# Patient Record
Sex: MALE | Race: WHITE | Employment: OTHER | URBAN - METROPOLITAN AREA
[De-identification: names, ages, dates, MRNs, and addresses within clinical notes are randomized per-mention and may not be internally consistent; named-entity substitution may affect disease eponyms.]

---

## 2018-03-26 ENCOUNTER — HOSPITAL ENCOUNTER (EMERGENCY)
Age: 38
Discharge: HOME | End: 2018-03-26
Payer: MEDICARE

## 2018-03-26 ENCOUNTER — APPOINTMENT (OUTPATIENT)
Dept: GENERAL RADIOLOGY | Age: 38
End: 2018-03-26
Payer: MEDICARE

## 2018-03-26 VITALS
HEART RATE: 80 BPM | BODY MASS INDEX: 27.2 KG/M2 | DIASTOLIC BLOOD PRESSURE: 84 MMHG | HEIGHT: 70 IN | RESPIRATION RATE: 18 BRPM | OXYGEN SATURATION: 100 % | TEMPERATURE: 98.2 F | WEIGHT: 190 LBS | SYSTOLIC BLOOD PRESSURE: 122 MMHG

## 2018-03-26 DIAGNOSIS — R42 DIZZINESS: Primary | ICD-10-CM

## 2018-03-26 DIAGNOSIS — K02.9 DENTAL CARIES: ICD-10-CM

## 2018-03-26 DIAGNOSIS — E86.0 DEHYDRATION: ICD-10-CM

## 2018-03-26 LAB
ALBUMIN SERPL-MCNC: 4.3 G/DL (ref 3.5–5.2)
ALP BLD-CCNC: 91 U/L (ref 40–129)
ALT SERPL-CCNC: 15 U/L (ref 0–40)
ANION GAP SERPL CALCULATED.3IONS-SCNC: 12 MMOL/L (ref 7–16)
AST SERPL-CCNC: 12 U/L (ref 0–39)
BASOPHILS ABSOLUTE: 0.03 E9/L (ref 0–0.2)
BASOPHILS RELATIVE PERCENT: 0.5 % (ref 0–2)
BILIRUB SERPL-MCNC: 0.8 MG/DL (ref 0–1.2)
BUN BLDV-MCNC: 16 MG/DL (ref 6–20)
CALCIUM SERPL-MCNC: 9.5 MG/DL (ref 8.6–10.2)
CHLORIDE BLD-SCNC: 99 MMOL/L (ref 98–107)
CHP ED QC CHECK: YES
CO2: 28 MMOL/L (ref 22–29)
CREAT SERPL-MCNC: 0.9 MG/DL (ref 0.7–1.2)
EOSINOPHILS ABSOLUTE: 0.17 E9/L (ref 0.05–0.5)
EOSINOPHILS RELATIVE PERCENT: 2.6 % (ref 0–6)
GFR AFRICAN AMERICAN: >60
GFR NON-AFRICAN AMERICAN: >60 ML/MIN/1.73
GLUCOSE BLD-MCNC: 229 MG/DL
GLUCOSE BLD-MCNC: 286 MG/DL (ref 74–109)
HCT VFR BLD CALC: 45.5 % (ref 37–54)
HEMOGLOBIN: 15.6 G/DL (ref 12.5–16.5)
IMMATURE GRANULOCYTES #: 0.02 E9/L
IMMATURE GRANULOCYTES %: 0.3 % (ref 0–5)
LYMPHOCYTES ABSOLUTE: 1.55 E9/L (ref 1.5–4)
LYMPHOCYTES RELATIVE PERCENT: 23.9 % (ref 20–42)
MAGNESIUM: 1.9 MG/DL (ref 1.6–2.6)
MCH RBC QN AUTO: 28 PG (ref 26–35)
MCHC RBC AUTO-ENTMCNC: 34.3 % (ref 32–34.5)
MCV RBC AUTO: 81.7 FL (ref 80–99.9)
METER GLUCOSE: 229 MG/DL (ref 70–110)
MONOCYTES ABSOLUTE: 0.41 E9/L (ref 0.1–0.95)
MONOCYTES RELATIVE PERCENT: 6.3 % (ref 2–12)
NEUTROPHILS ABSOLUTE: 4.3 E9/L (ref 1.8–7.3)
NEUTROPHILS RELATIVE PERCENT: 66.4 % (ref 43–80)
PDW BLD-RTO: 12.6 FL (ref 11.5–15)
PLATELET # BLD: 296 E9/L (ref 130–450)
PMV BLD AUTO: 10.2 FL (ref 7–12)
POTASSIUM SERPL-SCNC: 4.5 MMOL/L (ref 3.5–5)
RBC # BLD: 5.57 E12/L (ref 3.8–5.8)
SODIUM BLD-SCNC: 139 MMOL/L (ref 132–146)
TOTAL PROTEIN: 7.2 G/DL (ref 6.4–8.3)
WBC # BLD: 6.5 E9/L (ref 4.5–11.5)

## 2018-03-26 PROCEDURE — 85025 COMPLETE CBC W/AUTO DIFF WBC: CPT

## 2018-03-26 PROCEDURE — 99284 EMERGENCY DEPT VISIT MOD MDM: CPT

## 2018-03-26 PROCEDURE — 80053 COMPREHEN METABOLIC PANEL: CPT

## 2018-03-26 PROCEDURE — 83735 ASSAY OF MAGNESIUM: CPT

## 2018-03-26 PROCEDURE — 82962 GLUCOSE BLOOD TEST: CPT

## 2018-03-26 PROCEDURE — 36415 COLL VENOUS BLD VENIPUNCTURE: CPT

## 2018-03-26 PROCEDURE — 71046 X-RAY EXAM CHEST 2 VIEWS: CPT

## 2018-03-26 RX ORDER — IBUPROFEN 800 MG/1
800 TABLET ORAL EVERY 6 HOURS PRN
Qty: 20 TABLET | Refills: 0 | Status: SHIPPED | OUTPATIENT
Start: 2018-03-26 | End: 2019-08-09

## 2018-03-26 RX ORDER — AMOXICILLIN 500 MG/1
500 CAPSULE ORAL 3 TIMES DAILY
Qty: 30 CAPSULE | Refills: 0 | Status: SHIPPED | OUTPATIENT
Start: 2018-03-26 | End: 2018-04-05

## 2018-03-26 RX ORDER — MECLIZINE HYDROCHLORIDE 25 MG/1
25 TABLET ORAL 3 TIMES DAILY PRN
Qty: 21 TABLET | Refills: 0 | Status: SHIPPED | OUTPATIENT
Start: 2018-03-26 | End: 2018-03-28 | Stop reason: HOSPADM

## 2018-03-28 ENCOUNTER — OFFICE VISIT (OUTPATIENT)
Dept: INTERNAL MEDICINE | Age: 38
End: 2018-03-28
Payer: MEDICARE

## 2018-03-28 VITALS
BODY MASS INDEX: 29.35 KG/M2 | DIASTOLIC BLOOD PRESSURE: 78 MMHG | RESPIRATION RATE: 16 BRPM | SYSTOLIC BLOOD PRESSURE: 102 MMHG | HEIGHT: 70 IN | TEMPERATURE: 98 F | WEIGHT: 205 LBS | HEART RATE: 70 BPM

## 2018-03-28 DIAGNOSIS — Z23 NEED FOR INFLUENZA VACCINATION: ICD-10-CM

## 2018-03-28 DIAGNOSIS — R05.9 COUGH: ICD-10-CM

## 2018-03-28 DIAGNOSIS — Z79.4 TYPE 2 DIABETES MELLITUS WITH HYPERGLYCEMIA, WITH LONG-TERM CURRENT USE OF INSULIN (HCC): Primary | ICD-10-CM

## 2018-03-28 DIAGNOSIS — E11.65 TYPE 2 DIABETES MELLITUS WITH HYPERGLYCEMIA, WITH LONG-TERM CURRENT USE OF INSULIN (HCC): Primary | ICD-10-CM

## 2018-03-28 LAB — HBA1C MFR BLD: 10.4 %

## 2018-03-28 PROCEDURE — G8482 FLU IMMUNIZE ORDER/ADMIN: HCPCS | Performed by: INTERNAL MEDICINE

## 2018-03-28 PROCEDURE — G8427 DOCREV CUR MEDS BY ELIG CLIN: HCPCS | Performed by: INTERNAL MEDICINE

## 2018-03-28 PROCEDURE — 90732 PPSV23 VACC 2 YRS+ SUBQ/IM: CPT

## 2018-03-28 PROCEDURE — 83036 HEMOGLOBIN GLYCOSYLATED A1C: CPT | Performed by: INTERNAL MEDICINE

## 2018-03-28 PROCEDURE — G8419 CALC BMI OUT NRM PARAM NOF/U: HCPCS | Performed by: INTERNAL MEDICINE

## 2018-03-28 PROCEDURE — G0008 ADMIN INFLUENZA VIRUS VAC: HCPCS

## 2018-03-28 PROCEDURE — 6360000002 HC RX W HCPCS

## 2018-03-28 PROCEDURE — 3046F HEMOGLOBIN A1C LEVEL >9.0%: CPT | Performed by: INTERNAL MEDICINE

## 2018-03-28 PROCEDURE — 1036F TOBACCO NON-USER: CPT | Performed by: INTERNAL MEDICINE

## 2018-03-28 PROCEDURE — 99212 OFFICE O/P EST SF 10 MIN: CPT | Performed by: INTERNAL MEDICINE

## 2018-03-28 PROCEDURE — 99215 OFFICE O/P EST HI 40 MIN: CPT | Performed by: INTERNAL MEDICINE

## 2018-03-28 RX ORDER — DOXEPIN HYDROCHLORIDE 10 MG/1
CAPSULE ORAL
Refills: 1 | COMMUNITY
Start: 2018-03-05

## 2018-03-28 RX ORDER — BUDESONIDE AND FORMOTEROL FUMARATE DIHYDRATE 80; 4.5 UG/1; UG/1
2 AEROSOL RESPIRATORY (INHALATION) 2 TIMES DAILY
Qty: 1 INHALER | Refills: 5 | Status: SHIPPED | OUTPATIENT
Start: 2018-03-28 | End: 2018-08-16 | Stop reason: ALTCHOICE

## 2018-03-28 RX ORDER — INSULIN GLARGINE 100 [IU]/ML
55 INJECTION, SOLUTION SUBCUTANEOUS NIGHTLY
Qty: 5 VIAL | Refills: 5 | Status: SHIPPED | OUTPATIENT
Start: 2018-03-28 | End: 2018-04-27 | Stop reason: SDUPTHER

## 2018-03-28 RX ORDER — DULOXETIN HYDROCHLORIDE 60 MG/1
CAPSULE, DELAYED RELEASE ORAL
Refills: 3 | COMMUNITY
Start: 2018-02-04 | End: 2018-04-02

## 2018-03-28 RX ORDER — FLUTICASONE PROPIONATE 50 MCG
1 SPRAY, SUSPENSION (ML) NASAL DAILY
Qty: 1 BOTTLE | Refills: 5 | Status: SHIPPED | OUTPATIENT
Start: 2018-03-28 | End: 2018-12-19 | Stop reason: SDUPTHER

## 2018-03-28 RX ORDER — INSULIN GLARGINE 100 [IU]/ML
55 INJECTION, SOLUTION SUBCUTANEOUS NIGHTLY
Qty: 2 VIAL | Refills: 5 | Status: SHIPPED | OUTPATIENT
Start: 2018-03-28 | End: 2018-03-28 | Stop reason: SDUPTHER

## 2018-03-28 ASSESSMENT — ENCOUNTER SYMPTOMS
BACK PAIN: 0
SHORTNESS OF BREATH: 1
APNEA: 0
COLOR CHANGE: 0
ABDOMINAL DISTENTION: 0
DIARRHEA: 0
PHOTOPHOBIA: 0
VOMITING: 0
CHOKING: 0
WHEEZING: 0
ABDOMINAL PAIN: 0

## 2018-03-28 NOTE — PROGRESS NOTES
[facility] [address]  [facility]    Attending Physician Statement:  Quinton Pitt M.D., F.A.C.P. I have discussed the case, including pertinent history and exam findings with the resident. I have seen and examined the patient and the key elements of the encounter have been performed by me. I agree with the assessment, plan and orders as documented by the resident. Patient is seen for ER fu visit today. --march 26  Last ER notes reviewed, relative labs and imaging.  - chest tightness, felt wheezy, light headedness-- when exposed to fumes at train station  --these symptoms improved, but known asthma and takes albuteroll daily  -- ?moderate persistent asthma-- claims house has lot of mold    +dental caries, referral/appointment April 5    Seen one time with us prior in NOV-- noncompliant fu  Cough - going on 3 weeks no started with viral prodrome but still has sinus congestion, rhinorrhea (mild), and right middle ear effusion. These symptoms improved  He didn't fu in 1 week--but came in now 5months later  ? allerigies-- fu?  -     fexofenadine (ALLEGRA ALLERGY) 180 MG tablet; Take 1 tablet by mouth daily  -     fluticasone (FLONASE) 50 MCG/ACT nasal spray; 1 spray by Nasal route daily  -     XR CHEST STANDARD (2 VW)--Norml,  but wasn't done till seen in ER in March     Type 2 diabetes mellitus with hyperglycemia, with long-term current use of insulin ([address] - restarting metformin and lantus and follow up glucose logs in 1 week. Basic labs  -     metFORMIN (GLUCOPHAGE) 1000 MG tablet;  Take 1 tablet by mouth 2 times daily (with meals)--taking   -     insulin glargine (LANTUS) 100 UNIT/ML injection vial; Inject 50 Units into the skin nightly--taking 50--   -     Basic Metabolic Panel; N  -     Hemoglobin A1C; -- 8.4--but today now 10.4  -     Microalbumin / Creatinine Urine Ratio; N <30  -     Hepatic Function Panel; more recently in march N  -   ?mod persistent asthma without

## 2018-04-02 ENCOUNTER — OFFICE VISIT (OUTPATIENT)
Dept: INTERNAL MEDICINE | Age: 38
End: 2018-04-02
Payer: MEDICARE

## 2018-04-02 VITALS
HEART RATE: 86 BPM | WEIGHT: 210.8 LBS | TEMPERATURE: 98.2 F | BODY MASS INDEX: 30.18 KG/M2 | DIASTOLIC BLOOD PRESSURE: 80 MMHG | HEIGHT: 70 IN | SYSTOLIC BLOOD PRESSURE: 120 MMHG | RESPIRATION RATE: 16 BRPM

## 2018-04-02 DIAGNOSIS — B35.3 TINEA PEDIS OF BOTH FEET: ICD-10-CM

## 2018-04-02 DIAGNOSIS — E11.51 TYPE 2 DIABETES MELLITUS WITH DIABETIC PERIPHERAL ANGIOPATHY WITHOUT GANGRENE, UNSPECIFIED LONG TERM INSULIN USE STATUS: Primary | ICD-10-CM

## 2018-04-02 DIAGNOSIS — B35.1 ONYCHOMYCOSIS: ICD-10-CM

## 2018-04-02 PROCEDURE — 1036F TOBACCO NON-USER: CPT | Performed by: STUDENT IN AN ORGANIZED HEALTH CARE EDUCATION/TRAINING PROGRAM

## 2018-04-02 PROCEDURE — G8417 CALC BMI ABV UP PARAM F/U: HCPCS | Performed by: STUDENT IN AN ORGANIZED HEALTH CARE EDUCATION/TRAINING PROGRAM

## 2018-04-02 PROCEDURE — 99214 OFFICE O/P EST MOD 30 MIN: CPT | Performed by: STUDENT IN AN ORGANIZED HEALTH CARE EDUCATION/TRAINING PROGRAM

## 2018-04-02 PROCEDURE — G8427 DOCREV CUR MEDS BY ELIG CLIN: HCPCS | Performed by: STUDENT IN AN ORGANIZED HEALTH CARE EDUCATION/TRAINING PROGRAM

## 2018-04-02 PROCEDURE — 99212 OFFICE O/P EST SF 10 MIN: CPT | Performed by: INTERNAL MEDICINE

## 2018-04-02 PROCEDURE — 3046F HEMOGLOBIN A1C LEVEL >9.0%: CPT | Performed by: STUDENT IN AN ORGANIZED HEALTH CARE EDUCATION/TRAINING PROGRAM

## 2018-04-02 RX ORDER — GABAPENTIN 300 MG/1
300 CAPSULE ORAL DAILY
Qty: 30 CAPSULE | Refills: 2 | Status: SHIPPED | OUTPATIENT
Start: 2018-04-02 | End: 2018-05-17 | Stop reason: ALTCHOICE

## 2018-04-02 RX ORDER — TERBINAFINE HYDROCHLORIDE 250 MG/1
250 TABLET ORAL DAILY
Qty: 84 TABLET | Refills: 0 | Status: SHIPPED | OUTPATIENT
Start: 2018-04-02 | End: 2018-06-25

## 2018-04-02 ASSESSMENT — ENCOUNTER SYMPTOMS
DOUBLE VISION: 0
PHOTOPHOBIA: 1
RESPIRATORY NEGATIVE: 1
ORTHOPNEA: 0
BLURRED VISION: 1

## 2018-04-25 PROBLEM — E86.0 DEHYDRATION: Status: RESOLVED | Noted: 2018-03-26 | Resolved: 2018-04-25

## 2018-04-26 ENCOUNTER — HOSPITAL ENCOUNTER (EMERGENCY)
Age: 38
Discharge: HOME | End: 2018-04-26
Payer: MEDICARE

## 2018-04-26 ENCOUNTER — APPOINTMENT (OUTPATIENT)
Dept: CT IMAGING | Age: 38
End: 2018-04-26
Payer: MEDICARE

## 2018-04-26 VITALS
BODY MASS INDEX: 30.06 KG/M2 | DIASTOLIC BLOOD PRESSURE: 80 MMHG | TEMPERATURE: 97.5 F | RESPIRATION RATE: 16 BRPM | WEIGHT: 210 LBS | OXYGEN SATURATION: 99 % | SYSTOLIC BLOOD PRESSURE: 136 MMHG | HEART RATE: 86 BPM | HEIGHT: 70 IN

## 2018-04-26 DIAGNOSIS — E11.65 TYPE 2 DIABETES MELLITUS WITH HYPERGLYCEMIA, WITH LONG-TERM CURRENT USE OF INSULIN (HCC): ICD-10-CM

## 2018-04-26 DIAGNOSIS — R09.89 GLOBUS SENSATION: ICD-10-CM

## 2018-04-26 DIAGNOSIS — Z79.4 TYPE 2 DIABETES MELLITUS WITH HYPERGLYCEMIA, WITH LONG-TERM CURRENT USE OF INSULIN (HCC): ICD-10-CM

## 2018-04-26 DIAGNOSIS — E04.2 MULTINODULAR THYROID: ICD-10-CM

## 2018-04-26 DIAGNOSIS — E04.9 ENLARGED THYROID GLAND: Primary | ICD-10-CM

## 2018-04-26 LAB
ANION GAP SERPL CALCULATED.3IONS-SCNC: 13 MMOL/L (ref 7–16)
BASOPHILS ABSOLUTE: 0.03 E9/L (ref 0–0.2)
BASOPHILS RELATIVE PERCENT: 0.4 % (ref 0–2)
BUN BLDV-MCNC: 15 MG/DL (ref 6–20)
CALCIUM SERPL-MCNC: 9.7 MG/DL (ref 8.6–10.2)
CHLORIDE BLD-SCNC: 97 MMOL/L (ref 98–107)
CO2: 27 MMOL/L (ref 22–29)
CREAT SERPL-MCNC: 1 MG/DL (ref 0.7–1.2)
EOSINOPHILS ABSOLUTE: 0.13 E9/L (ref 0.05–0.5)
EOSINOPHILS RELATIVE PERCENT: 1.9 % (ref 0–6)
GFR AFRICAN AMERICAN: >60
GFR NON-AFRICAN AMERICAN: >60 ML/MIN/1.73
GLUCOSE BLD-MCNC: 314 MG/DL (ref 74–109)
HCT VFR BLD CALC: 42.6 % (ref 37–54)
HEMOGLOBIN: 14.8 G/DL (ref 12.5–16.5)
IMMATURE GRANULOCYTES #: 0.02 E9/L
IMMATURE GRANULOCYTES %: 0.3 % (ref 0–5)
LYMPHOCYTES ABSOLUTE: 1.89 E9/L (ref 1.5–4)
LYMPHOCYTES RELATIVE PERCENT: 27.4 % (ref 20–42)
MCH RBC QN AUTO: 28.2 PG (ref 26–35)
MCHC RBC AUTO-ENTMCNC: 34.7 % (ref 32–34.5)
MCV RBC AUTO: 81.3 FL (ref 80–99.9)
MONOCYTES ABSOLUTE: 0.61 E9/L (ref 0.1–0.95)
MONOCYTES RELATIVE PERCENT: 8.9 % (ref 2–12)
NEUTROPHILS ABSOLUTE: 4.21 E9/L (ref 1.8–7.3)
NEUTROPHILS RELATIVE PERCENT: 61.1 % (ref 43–80)
PDW BLD-RTO: 12.7 FL (ref 11.5–15)
PLATELET # BLD: 265 E9/L (ref 130–450)
PMV BLD AUTO: 9.8 FL (ref 7–12)
POTASSIUM SERPL-SCNC: 4.3 MMOL/L (ref 3.5–5)
RBC # BLD: 5.24 E12/L (ref 3.8–5.8)
SODIUM BLD-SCNC: 137 MMOL/L (ref 132–146)
WBC # BLD: 6.9 E9/L (ref 4.5–11.5)

## 2018-04-26 PROCEDURE — 2580000003 HC RX 258: Performed by: NURSE PRACTITIONER

## 2018-04-26 PROCEDURE — 80048 BASIC METABOLIC PNL TOTAL CA: CPT

## 2018-04-26 PROCEDURE — 84436 ASSAY OF TOTAL THYROXINE: CPT

## 2018-04-26 PROCEDURE — 99284 EMERGENCY DEPT VISIT MOD MDM: CPT

## 2018-04-26 PROCEDURE — 84443 ASSAY THYROID STIM HORMONE: CPT

## 2018-04-26 PROCEDURE — 2580000003 HC RX 258

## 2018-04-26 PROCEDURE — 36415 COLL VENOUS BLD VENIPUNCTURE: CPT

## 2018-04-26 PROCEDURE — 6360000002 HC RX W HCPCS: Performed by: NURSE PRACTITIONER

## 2018-04-26 PROCEDURE — 96374 THER/PROPH/DIAG INJ IV PUSH: CPT

## 2018-04-26 PROCEDURE — 6360000004 HC RX CONTRAST MEDICATION: Performed by: RADIOLOGY

## 2018-04-26 PROCEDURE — C9113 INJ PANTOPRAZOLE SODIUM, VIA: HCPCS | Performed by: NURSE PRACTITIONER

## 2018-04-26 PROCEDURE — 70491 CT SOFT TISSUE NECK W/DYE: CPT

## 2018-04-26 PROCEDURE — 85025 COMPLETE CBC W/AUTO DIFF WBC: CPT

## 2018-04-26 RX ORDER — OMEPRAZOLE 20 MG/1
20 CAPSULE, DELAYED RELEASE ORAL EVERY 12 HOURS
Qty: 28 CAPSULE | Refills: 0 | Status: SHIPPED | OUTPATIENT
Start: 2018-04-26 | End: 2018-08-16 | Stop reason: ALTCHOICE

## 2018-04-26 RX ORDER — PANTOPRAZOLE SODIUM 40 MG/10ML
40 INJECTION, POWDER, LYOPHILIZED, FOR SOLUTION INTRAVENOUS ONCE
Status: COMPLETED | OUTPATIENT
Start: 2018-04-26 | End: 2018-04-26

## 2018-04-26 RX ORDER — 0.9 % SODIUM CHLORIDE 0.9 %
1000 INTRAVENOUS SOLUTION INTRAVENOUS ONCE
Status: COMPLETED | OUTPATIENT
Start: 2018-04-26 | End: 2018-04-26

## 2018-04-26 RX ORDER — 0.9 % SODIUM CHLORIDE 0.9 %
VIAL (ML) INJECTION
Status: COMPLETED
Start: 2018-04-26 | End: 2018-04-26

## 2018-04-26 RX ADMIN — PANTOPRAZOLE SODIUM 40 MG: 40 INJECTION, POWDER, FOR SOLUTION INTRAVENOUS at 23:09

## 2018-04-26 RX ADMIN — SODIUM CHLORIDE 10 ML: 9 INJECTION INTRAMUSCULAR; INTRAVENOUS; SUBCUTANEOUS at 23:09

## 2018-04-26 RX ADMIN — IOPAMIDOL 90 ML: 755 INJECTION, SOLUTION INTRAVENOUS at 22:52

## 2018-04-26 RX ADMIN — SODIUM CHLORIDE 1000 ML: 9 INJECTION, SOLUTION INTRAVENOUS at 22:22

## 2018-04-26 ASSESSMENT — PAIN DESCRIPTION - PAIN TYPE: TYPE: ACUTE PAIN

## 2018-04-26 ASSESSMENT — PAIN DESCRIPTION - LOCATION: LOCATION: JAW

## 2018-04-26 ASSESSMENT — PAIN DESCRIPTION - DESCRIPTORS: DESCRIPTORS: ACHING

## 2018-04-26 ASSESSMENT — PAIN SCALES - GENERAL: PAINLEVEL_OUTOF10: 1

## 2018-04-27 ENCOUNTER — OFFICE VISIT (OUTPATIENT)
Dept: INTERNAL MEDICINE | Age: 38
End: 2018-04-27
Payer: MEDICARE

## 2018-04-27 VITALS
SYSTOLIC BLOOD PRESSURE: 120 MMHG | TEMPERATURE: 98.1 F | HEIGHT: 71 IN | BODY MASS INDEX: 29.03 KG/M2 | HEART RATE: 92 BPM | DIASTOLIC BLOOD PRESSURE: 82 MMHG | WEIGHT: 207.4 LBS

## 2018-04-27 DIAGNOSIS — Z79.4 TYPE 2 DIABETES MELLITUS WITH HYPERGLYCEMIA, WITH LONG-TERM CURRENT USE OF INSULIN (HCC): ICD-10-CM

## 2018-04-27 DIAGNOSIS — E11.65 TYPE 2 DIABETES MELLITUS WITH HYPERGLYCEMIA, WITH LONG-TERM CURRENT USE OF INSULIN (HCC): ICD-10-CM

## 2018-04-27 DIAGNOSIS — E04.2 MULTINODULAR GOITER: Primary | ICD-10-CM

## 2018-04-27 DIAGNOSIS — Z11.4 ENCOUNTER FOR SCREENING FOR HIV: ICD-10-CM

## 2018-04-27 LAB
T4 TOTAL: 5.9 MCG/DL (ref 4.5–11.7)
TSH SERPL DL<=0.05 MIU/L-ACNC: 0.81 UIU/ML (ref 0.27–4.2)

## 2018-04-27 PROCEDURE — 99213 OFFICE O/P EST LOW 20 MIN: CPT | Performed by: INTERNAL MEDICINE

## 2018-04-27 PROCEDURE — 4004F PT TOBACCO SCREEN RCVD TLK: CPT | Performed by: INTERNAL MEDICINE

## 2018-04-27 PROCEDURE — G8417 CALC BMI ABV UP PARAM F/U: HCPCS | Performed by: INTERNAL MEDICINE

## 2018-04-27 PROCEDURE — 2022F DILAT RTA XM EVC RTNOPTHY: CPT | Performed by: INTERNAL MEDICINE

## 2018-04-27 PROCEDURE — 3046F HEMOGLOBIN A1C LEVEL >9.0%: CPT | Performed by: INTERNAL MEDICINE

## 2018-04-27 PROCEDURE — G8427 DOCREV CUR MEDS BY ELIG CLIN: HCPCS | Performed by: INTERNAL MEDICINE

## 2018-04-27 PROCEDURE — 99214 OFFICE O/P EST MOD 30 MIN: CPT | Performed by: INTERNAL MEDICINE

## 2018-04-27 RX ORDER — INSULIN GLARGINE 100 [IU]/ML
60 INJECTION, SOLUTION SUBCUTANEOUS NIGHTLY
Qty: 5 VIAL | Refills: 5 | Status: SHIPPED | OUTPATIENT
Start: 2018-04-27 | End: 2018-08-28 | Stop reason: SDUPTHER

## 2018-04-27 ASSESSMENT — ENCOUNTER SYMPTOMS
TROUBLE SWALLOWING: 0
SORE THROAT: 0
COLOR CHANGE: 0
VOICE CHANGE: 0
APNEA: 0
VOMITING: 0
ABDOMINAL DISTENTION: 0
BACK PAIN: 0
WHEEZING: 0
PHOTOPHOBIA: 0
ABDOMINAL PAIN: 0

## 2018-05-12 ENCOUNTER — HOSPITAL ENCOUNTER (EMERGENCY)
Age: 38
Discharge: HOME | End: 2018-05-12
Attending: EMERGENCY MEDICINE
Payer: MEDICARE

## 2018-05-12 VITALS
BODY MASS INDEX: 28.7 KG/M2 | RESPIRATION RATE: 14 BRPM | TEMPERATURE: 98.2 F | HEIGHT: 71 IN | HEART RATE: 82 BPM | SYSTOLIC BLOOD PRESSURE: 126 MMHG | OXYGEN SATURATION: 98 % | DIASTOLIC BLOOD PRESSURE: 80 MMHG | WEIGHT: 205 LBS

## 2018-05-12 DIAGNOSIS — L03.114 CELLULITIS OF LEFT HAND: Primary | ICD-10-CM

## 2018-05-12 PROCEDURE — 99281 EMR DPT VST MAYX REQ PHY/QHP: CPT

## 2018-05-12 RX ORDER — CEPHALEXIN 500 MG/1
500 CAPSULE ORAL 4 TIMES DAILY
Qty: 28 CAPSULE | Refills: 0 | Status: SHIPPED | OUTPATIENT
Start: 2018-05-12 | End: 2018-05-19

## 2018-05-15 ENCOUNTER — HOSPITAL ENCOUNTER (OUTPATIENT)
Dept: ULTRASOUND IMAGING | Age: 38
Discharge: HOME | End: 2018-05-17
Payer: MEDICARE

## 2018-05-15 ENCOUNTER — OFFICE VISIT (OUTPATIENT)
Dept: INTERNAL MEDICINE | Age: 38
End: 2018-05-15
Payer: MEDICARE

## 2018-05-15 VITALS
WEIGHT: 215 LBS | DIASTOLIC BLOOD PRESSURE: 75 MMHG | HEART RATE: 83 BPM | TEMPERATURE: 97.9 F | RESPIRATION RATE: 16 BRPM | SYSTOLIC BLOOD PRESSURE: 119 MMHG | HEIGHT: 71 IN | BODY MASS INDEX: 30.1 KG/M2

## 2018-05-15 DIAGNOSIS — E11.40 TYPE 2 DIABETES MELLITUS WITH DIABETIC NEUROPATHY, WITH LONG-TERM CURRENT USE OF INSULIN (HCC): Primary | ICD-10-CM

## 2018-05-15 DIAGNOSIS — Z79.4 TYPE 2 DIABETES MELLITUS WITH DIABETIC NEUROPATHY, WITH LONG-TERM CURRENT USE OF INSULIN (HCC): Primary | ICD-10-CM

## 2018-05-15 DIAGNOSIS — G72.9 MYOPATHY: ICD-10-CM

## 2018-05-15 DIAGNOSIS — L29.9 ITCHING WITH IRRITATION: ICD-10-CM

## 2018-05-15 DIAGNOSIS — L03.114 CELLULITIS OF LEFT UPPER EXTREMITY: ICD-10-CM

## 2018-05-15 DIAGNOSIS — E04.2 MULTINODULAR GOITER: ICD-10-CM

## 2018-05-15 PROCEDURE — 3046F HEMOGLOBIN A1C LEVEL >9.0%: CPT | Performed by: INTERNAL MEDICINE

## 2018-05-15 PROCEDURE — 2022F DILAT RTA XM EVC RTNOPTHY: CPT | Performed by: INTERNAL MEDICINE

## 2018-05-15 PROCEDURE — 99213 OFFICE O/P EST LOW 20 MIN: CPT | Performed by: INTERNAL MEDICINE

## 2018-05-15 PROCEDURE — 1036F TOBACCO NON-USER: CPT | Performed by: INTERNAL MEDICINE

## 2018-05-15 PROCEDURE — G8417 CALC BMI ABV UP PARAM F/U: HCPCS | Performed by: INTERNAL MEDICINE

## 2018-05-15 PROCEDURE — G8427 DOCREV CUR MEDS BY ELIG CLIN: HCPCS | Performed by: INTERNAL MEDICINE

## 2018-05-15 PROCEDURE — 76536 US EXAM OF HEAD AND NECK: CPT

## 2018-05-15 RX ORDER — CLOTRIMAZOLE 1 %
CREAM (GRAM) TOPICAL
Qty: 1 TUBE | Refills: 3 | Status: SHIPPED | OUTPATIENT
Start: 2018-05-15 | End: 2018-05-22

## 2018-05-15 ASSESSMENT — ENCOUNTER SYMPTOMS
GASTROINTESTINAL NEGATIVE: 1
EYES NEGATIVE: 1
RESPIRATORY NEGATIVE: 1

## 2018-05-17 ENCOUNTER — OFFICE VISIT (OUTPATIENT)
Dept: INTERNAL MEDICINE | Age: 38
End: 2018-05-17
Payer: MEDICARE

## 2018-05-17 ENCOUNTER — HOSPITAL ENCOUNTER (OUTPATIENT)
Age: 38
Discharge: HOME | End: 2018-05-17
Payer: MEDICARE

## 2018-05-17 VITALS
SYSTOLIC BLOOD PRESSURE: 130 MMHG | TEMPERATURE: 98.1 F | BODY MASS INDEX: 30.15 KG/M2 | HEIGHT: 71 IN | RESPIRATION RATE: 16 BRPM | OXYGEN SATURATION: 95 % | HEART RATE: 85 BPM | DIASTOLIC BLOOD PRESSURE: 70 MMHG | WEIGHT: 215.4 LBS

## 2018-05-17 DIAGNOSIS — R42 LIGHTHEADEDNESS: ICD-10-CM

## 2018-05-17 DIAGNOSIS — L03.114 CELLULITIS OF HAND, LEFT: Primary | ICD-10-CM

## 2018-05-17 DIAGNOSIS — Z79.4 TYPE 2 DIABETES MELLITUS WITH HYPERGLYCEMIA, WITH LONG-TERM CURRENT USE OF INSULIN (HCC): ICD-10-CM

## 2018-05-17 DIAGNOSIS — F33.3 SEVERE EPISODE OF RECURRENT MAJOR DEPRESSIVE DISORDER, WITH PSYCHOTIC FEATURES (HCC): ICD-10-CM

## 2018-05-17 DIAGNOSIS — G72.9 MYOPATHY: ICD-10-CM

## 2018-05-17 DIAGNOSIS — F22 PARANOIA (HCC): ICD-10-CM

## 2018-05-17 DIAGNOSIS — E11.65 TYPE 2 DIABETES MELLITUS WITH HYPERGLYCEMIA, WITH LONG-TERM CURRENT USE OF INSULIN (HCC): ICD-10-CM

## 2018-05-17 DIAGNOSIS — E11.9 DIABETES MELLITUS TYPE 2 IN NONOBESE (HCC): ICD-10-CM

## 2018-05-17 DIAGNOSIS — Z11.4 ENCOUNTER FOR SCREENING FOR HIV: ICD-10-CM

## 2018-05-17 LAB
CHOLESTEROL, TOTAL: 144 MG/DL (ref 0–199)
HDLC SERPL-MCNC: 52 MG/DL
LDL CHOLESTEROL CALCULATED: 71 MG/DL (ref 0–99)
TRIGL SERPL-MCNC: 104 MG/DL (ref 0–149)
VLDLC SERPL CALC-MCNC: 21 MG/DL

## 2018-05-17 PROCEDURE — 86235 NUCLEAR ANTIGEN ANTIBODY: CPT

## 2018-05-17 PROCEDURE — 86039 ANTINUCLEAR ANTIBODIES (ANA): CPT

## 2018-05-17 PROCEDURE — 36415 COLL VENOUS BLD VENIPUNCTURE: CPT

## 2018-05-17 PROCEDURE — 86703 HIV-1/HIV-2 1 RESULT ANTBDY: CPT

## 2018-05-17 PROCEDURE — 99213 OFFICE O/P EST LOW 20 MIN: CPT | Performed by: INTERNAL MEDICINE

## 2018-05-17 PROCEDURE — 1036F TOBACCO NON-USER: CPT | Performed by: INTERNAL MEDICINE

## 2018-05-17 PROCEDURE — 86038 ANTINUCLEAR ANTIBODIES: CPT

## 2018-05-17 PROCEDURE — 3046F HEMOGLOBIN A1C LEVEL >9.0%: CPT | Performed by: INTERNAL MEDICINE

## 2018-05-17 PROCEDURE — 2022F DILAT RTA XM EVC RTNOPTHY: CPT | Performed by: INTERNAL MEDICINE

## 2018-05-17 PROCEDURE — 80061 LIPID PANEL: CPT

## 2018-05-17 PROCEDURE — G8417 CALC BMI ABV UP PARAM F/U: HCPCS | Performed by: INTERNAL MEDICINE

## 2018-05-17 PROCEDURE — G8427 DOCREV CUR MEDS BY ELIG CLIN: HCPCS | Performed by: INTERNAL MEDICINE

## 2018-05-17 PROCEDURE — 99212 OFFICE O/P EST SF 10 MIN: CPT | Performed by: INTERNAL MEDICINE

## 2018-05-18 LAB
ANA PATTERN: ABNORMAL
ANA TITER: ABNORMAL
ANTI JO-1 IGG: NEGATIVE
ANTI-NUCLEAR ANTIBODY (ANA): POSITIVE
HIV-1 AND HIV-2 ANTIBODIES: NORMAL

## 2018-05-22 ENCOUNTER — HOSPITAL ENCOUNTER (OUTPATIENT)
Dept: DIABETES SERVICES | Age: 38
Setting detail: THERAPIES SERIES
Discharge: HOME | End: 2018-05-22
Payer: MEDICARE

## 2018-05-22 PROCEDURE — G0109 DIAB MANAGE TRN IND/GROUP: HCPCS

## 2018-05-23 ENCOUNTER — HOSPITAL ENCOUNTER (OUTPATIENT)
Dept: DIABETES SERVICES | Age: 38
Setting detail: THERAPIES SERIES
Discharge: HOME | End: 2018-05-23
Payer: MEDICARE

## 2018-05-23 PROCEDURE — G0109 DIAB MANAGE TRN IND/GROUP: HCPCS

## 2018-05-24 ASSESSMENT — PATIENT HEALTH QUESTIONNAIRE - PHQ9
2. FEELING DOWN, DEPRESSED OR HOPELESS: 0
3. TROUBLE FALLING OR STAYING ASLEEP: 1
10. IF YOU CHECKED OFF ANY PROBLEMS, HOW DIFFICULT HAVE THESE PROBLEMS MADE IT FOR YOU TO DO YOUR WORK, TAKE CARE OF THINGS AT HOME, OR GET ALONG WITH OTHER PEOPLE: 0
9. THOUGHTS THAT YOU WOULD BE BETTER OFF DEAD, OR OF HURTING YOURSELF: 0
7. TROUBLE CONCENTRATING ON THINGS, SUCH AS READING THE NEWSPAPER OR WATCHING TELEVISION: 0
1. LITTLE INTEREST OR PLEASURE IN DOING THINGS: 0
SUM OF ALL RESPONSES TO PHQ QUESTIONS 1-9: 5
5. POOR APPETITE OR OVEREATING: 2
6. FEELING BAD ABOUT YOURSELF - OR THAT YOU ARE A FAILURE OR HAVE LET YOURSELF OR YOUR FAMILY DOWN: 0
4. FEELING TIRED OR HAVING LITTLE ENERGY: 2
SUM OF ALL RESPONSES TO PHQ9 QUESTIONS 1 & 2: 0
8. MOVING OR SPEAKING SO SLOWLY THAT OTHER PEOPLE COULD HAVE NOTICED. OR THE OPPOSITE, BEING SO FIGETY OR RESTLESS THAT YOU HAVE BEEN MOVING AROUND A LOT MORE THAN USUAL: 0

## 2018-06-11 ENCOUNTER — HOSPITAL ENCOUNTER (EMERGENCY)
Age: 38
Discharge: HOME | End: 2018-06-11
Attending: FAMILY MEDICINE
Payer: MEDICARE

## 2018-06-11 VITALS
OXYGEN SATURATION: 99 % | HEART RATE: 88 BPM | BODY MASS INDEX: 30.1 KG/M2 | HEIGHT: 71 IN | TEMPERATURE: 98.6 F | WEIGHT: 215 LBS | RESPIRATION RATE: 16 BRPM | DIASTOLIC BLOOD PRESSURE: 80 MMHG | SYSTOLIC BLOOD PRESSURE: 130 MMHG

## 2018-06-11 DIAGNOSIS — L03.119 CELLULITIS AND ABSCESS OF LEG: Primary | ICD-10-CM

## 2018-06-11 DIAGNOSIS — L02.419 CELLULITIS AND ABSCESS OF LEG: Primary | ICD-10-CM

## 2018-06-11 PROCEDURE — 99282 EMERGENCY DEPT VISIT SF MDM: CPT

## 2018-06-11 RX ORDER — SULFAMETHOXAZOLE AND TRIMETHOPRIM 800; 160 MG/1; MG/1
1 TABLET ORAL 2 TIMES DAILY
Qty: 20 TABLET | Refills: 0 | Status: SHIPPED | OUTPATIENT
Start: 2018-06-11 | End: 2018-06-21

## 2018-06-11 RX ORDER — MUPIROCIN CALCIUM 20 MG/G
CREAM TOPICAL
Qty: 30 G | Refills: 0 | Status: SHIPPED | OUTPATIENT
Start: 2018-06-11 | End: 2018-09-10 | Stop reason: SDUPTHER

## 2018-06-11 ASSESSMENT — ENCOUNTER SYMPTOMS
THROAT SWELLING: 0
NAUSEA: 0
VOMITING: 0
SORE THROAT: 0
WHEEZING: 0
HOARSE VOICE: 0
SHORTNESS OF BREATH: 0
PERI-ORBITAL EDEMA: 0
ABDOMINAL PAIN: 0
DIARRHEA: 0

## 2018-06-11 ASSESSMENT — PAIN SCALES - GENERAL: PAINLEVEL_OUTOF10: 0

## 2018-06-13 ENCOUNTER — TELEPHONE (OUTPATIENT)
Dept: INTERNAL MEDICINE | Age: 38
End: 2018-06-13

## 2018-08-16 ENCOUNTER — HOSPITAL ENCOUNTER (EMERGENCY)
Age: 38
Discharge: HOME | End: 2018-08-16
Attending: FAMILY MEDICINE
Payer: MEDICARE

## 2018-08-16 VITALS
TEMPERATURE: 98.4 F | BODY MASS INDEX: 30.06 KG/M2 | HEIGHT: 70 IN | RESPIRATION RATE: 16 BRPM | HEART RATE: 89 BPM | SYSTOLIC BLOOD PRESSURE: 136 MMHG | WEIGHT: 210 LBS | OXYGEN SATURATION: 100 % | DIASTOLIC BLOOD PRESSURE: 84 MMHG

## 2018-08-16 DIAGNOSIS — L73.9 FOLLICULITIS: ICD-10-CM

## 2018-08-16 DIAGNOSIS — K21.9 CHRONIC GASTROESOPHAGEAL REFLUX DISEASE: Primary | ICD-10-CM

## 2018-08-16 PROCEDURE — 99283 EMERGENCY DEPT VISIT LOW MDM: CPT

## 2018-08-16 RX ORDER — CLINDAMYCIN PHOSPHATE 10 MG/G
GEL TOPICAL
Qty: 60 G | Refills: 0 | Status: SHIPPED | OUTPATIENT
Start: 2018-08-16 | End: 2018-08-23

## 2018-08-16 RX ORDER — PRENATAL VIT 91/IRON/FOLIC/DHA 28-975-200
COMBINATION PACKAGE (EA) ORAL
Qty: 24 G | Refills: 0 | Status: SHIPPED | OUTPATIENT
Start: 2018-08-16

## 2018-08-16 RX ORDER — OMEPRAZOLE 20 MG/1
20 CAPSULE, DELAYED RELEASE ORAL EVERY 12 HOURS
Qty: 28 CAPSULE | Refills: 0 | Status: SHIPPED | OUTPATIENT
Start: 2018-08-16 | End: 2018-12-19 | Stop reason: SDUPTHER

## 2018-08-16 NOTE — ED PROVIDER NOTES
HPI:  8/16/18,   Time: 4:40 PM         Leroy Rosario is a 43 y.o. male presenting to the ED for worsening gastric reflux, beginning weeks ago. The complaint has been persistent, moderate in severity, and worsened by nothing. Has been taking Prilosec daily without much improvement there is no difficulty with secretions no hoarseness no cough fever or chills no chest pain no abdominal pain patient does have a rash on the medial mid left thigh in a different type of itchy rash proximal medial aspect of the right is no weeping. ROS:   Pertinent positives and negatives are stated within HPI, all other systems reviewed and are negative.  --------------------------------------------- PAST HISTORY ---------------------------------------------  Past Medical History:  has a past medical history of Asthma; Diabetes mellitus ([facility] [address] and Hypertension. Past Surgical History:  has no past surgical history on file. Social History:  reports that he quit smoking about 2 years ago. He has a 4.00 pack-year smoking history. He has never used smokeless tobacco. He reports that he does not drink alcohol or use drugs. Family History: family history includes Heart Attack in his father; Lupus in his mother. The patients home medications have been reviewed. Allergies: Milk-related compounds    -------------------------------------------------- RESULTS -------------------------------------------------  All laboratory and radiology results have been personally reviewed by myself   LABS:  No results found for this visit on 08/16/18. RADIOLOGY:  Interpreted by Radiologist.  No orders to display       ------------------------- NURSING NOTES AND VITALS REVIEWED ---------------------------   The nursing notes within the ED encounter and vital signs as below have been reviewed.    /84   Pulse 89   Temp 98.4 °F (36.9 °C) (Oral)   Resp 16   Ht 5' 10\" (1.778 m)   Wt 210 lb (95.3 kg)   SpO2 100%   BMI 30.13 kg/m²

## 2018-08-28 ENCOUNTER — OFFICE VISIT (OUTPATIENT)
Dept: INTERNAL MEDICINE | Age: 38
End: 2018-08-28
Payer: MEDICARE

## 2018-08-28 VITALS
RESPIRATION RATE: 16 BRPM | HEIGHT: 70 IN | WEIGHT: 200 LBS | SYSTOLIC BLOOD PRESSURE: 120 MMHG | HEART RATE: 90 BPM | BODY MASS INDEX: 28.63 KG/M2 | TEMPERATURE: 98.9 F | DIASTOLIC BLOOD PRESSURE: 80 MMHG

## 2018-08-28 DIAGNOSIS — Z79.4 TYPE 2 DIABETES MELLITUS WITH HYPERGLYCEMIA, WITH LONG-TERM CURRENT USE OF INSULIN (HCC): ICD-10-CM

## 2018-08-28 DIAGNOSIS — L73.9 FOLLICULITIS: Primary | ICD-10-CM

## 2018-08-28 DIAGNOSIS — R53.83 FATIGUE, UNSPECIFIED TYPE: ICD-10-CM

## 2018-08-28 DIAGNOSIS — Z11.59 ENCOUNTER FOR SCREENING FOR OTHER VIRAL DISEASES: ICD-10-CM

## 2018-08-28 DIAGNOSIS — F19.11 HISTORY OF INTRAVENOUS DRUG ABUSE (HCC): ICD-10-CM

## 2018-08-28 DIAGNOSIS — E11.65 TYPE 2 DIABETES MELLITUS WITH HYPERGLYCEMIA, WITH LONG-TERM CURRENT USE OF INSULIN (HCC): ICD-10-CM

## 2018-08-28 LAB — HBA1C MFR BLD: 12.3 %

## 2018-08-28 PROCEDURE — G8428 CUR MEDS NOT DOCUMENT: HCPCS | Performed by: STUDENT IN AN ORGANIZED HEALTH CARE EDUCATION/TRAINING PROGRAM

## 2018-08-28 PROCEDURE — 1036F TOBACCO NON-USER: CPT | Performed by: STUDENT IN AN ORGANIZED HEALTH CARE EDUCATION/TRAINING PROGRAM

## 2018-08-28 PROCEDURE — 2022F DILAT RTA XM EVC RTNOPTHY: CPT | Performed by: STUDENT IN AN ORGANIZED HEALTH CARE EDUCATION/TRAINING PROGRAM

## 2018-08-28 PROCEDURE — G8417 CALC BMI ABV UP PARAM F/U: HCPCS | Performed by: STUDENT IN AN ORGANIZED HEALTH CARE EDUCATION/TRAINING PROGRAM

## 2018-08-28 PROCEDURE — 83036 HEMOGLOBIN GLYCOSYLATED A1C: CPT | Performed by: STUDENT IN AN ORGANIZED HEALTH CARE EDUCATION/TRAINING PROGRAM

## 2018-08-28 PROCEDURE — 99214 OFFICE O/P EST MOD 30 MIN: CPT | Performed by: STUDENT IN AN ORGANIZED HEALTH CARE EDUCATION/TRAINING PROGRAM

## 2018-08-28 PROCEDURE — 3046F HEMOGLOBIN A1C LEVEL >9.0%: CPT | Performed by: STUDENT IN AN ORGANIZED HEALTH CARE EDUCATION/TRAINING PROGRAM

## 2018-08-28 PROCEDURE — 99212 OFFICE O/P EST SF 10 MIN: CPT | Performed by: STUDENT IN AN ORGANIZED HEALTH CARE EDUCATION/TRAINING PROGRAM

## 2018-08-28 RX ORDER — INSULIN GLARGINE 100 [IU]/ML
20 INJECTION, SOLUTION SUBCUTANEOUS NIGHTLY
Qty: 5 VIAL | Refills: 5 | Status: SHIPPED | OUTPATIENT
Start: 2018-08-28 | End: 2018-10-24 | Stop reason: SDUPTHER

## 2018-08-28 RX ORDER — SITAGLIPTIN 50 MG/1
50 TABLET, FILM COATED ORAL DAILY
Qty: 30 TABLET | Refills: 3
Start: 2018-08-28 | End: 2018-09-10 | Stop reason: SDUPTHER

## 2018-08-28 RX ORDER — GLUCOSAMINE HCL/CHONDROITIN SU 500-400 MG
CAPSULE ORAL
Qty: 100 STRIP | Refills: 2 | Status: SHIPPED | OUTPATIENT
Start: 2018-08-28 | End: 2018-10-24 | Stop reason: SDUPTHER

## 2018-08-28 RX ORDER — SULFAMETHOXAZOLE AND TRIMETHOPRIM 400; 80 MG/1; MG/1
1 TABLET ORAL 2 TIMES DAILY
Qty: 20 TABLET | Refills: 0 | Status: SHIPPED | OUTPATIENT
Start: 2018-08-28 | End: 2018-09-07
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
## 2018-08-30 ENCOUNTER — OFFICE VISIT (OUTPATIENT)
Dept: INTERNAL MEDICINE | Age: 38
End: 2018-08-30
Payer: MEDICARE

## 2018-08-30 ENCOUNTER — HOSPITAL ENCOUNTER (OUTPATIENT)
Age: 38
Discharge: HOME | End: 2018-08-30
Payer: MEDICARE

## 2018-08-30 VITALS
RESPIRATION RATE: 16 BRPM | WEIGHT: 198.9 LBS | BODY MASS INDEX: 28.47 KG/M2 | HEIGHT: 70 IN | HEART RATE: 86 BPM | DIASTOLIC BLOOD PRESSURE: 76 MMHG | TEMPERATURE: 97.7 F | SYSTOLIC BLOOD PRESSURE: 112 MMHG

## 2018-08-30 DIAGNOSIS — F19.11 HISTORY OF INTRAVENOUS DRUG ABUSE (HCC): ICD-10-CM

## 2018-08-30 DIAGNOSIS — K02.9 DENTAL CARIES: ICD-10-CM

## 2018-08-30 DIAGNOSIS — Z11.59 ENCOUNTER FOR SCREENING FOR OTHER VIRAL DISEASES: ICD-10-CM

## 2018-08-30 PROCEDURE — 36415 COLL VENOUS BLD VENIPUNCTURE: CPT

## 2018-08-30 PROCEDURE — 99212 OFFICE O/P EST SF 10 MIN: CPT | Performed by: INTERNAL MEDICINE

## 2018-08-30 PROCEDURE — 1036F TOBACCO NON-USER: CPT | Performed by: INTERNAL MEDICINE

## 2018-08-30 PROCEDURE — G8427 DOCREV CUR MEDS BY ELIG CLIN: HCPCS | Performed by: INTERNAL MEDICINE

## 2018-08-30 PROCEDURE — 86706 HEP B SURFACE ANTIBODY: CPT

## 2018-08-30 PROCEDURE — G8417 CALC BMI ABV UP PARAM F/U: HCPCS | Performed by: INTERNAL MEDICINE

## 2018-08-30 PROCEDURE — 99213 OFFICE O/P EST LOW 20 MIN: CPT | Performed by: INTERNAL MEDICINE

## 2018-08-30 PROCEDURE — 86803 HEPATITIS C AB TEST: CPT

## 2018-08-30 PROCEDURE — 87340 HEPATITIS B SURFACE AG IA: CPT

## 2018-08-30 NOTE — PROGRESS NOTES
Inject 20 Units into the skin nightly 5 vial 5    omeprazole (PRILOSEC) 20 MG delayed release capsule Take 1 capsule by mouth every 12 hours for 14 days 28 capsule 0    terbinafine (LAMISIL) 1 % cream Apply topically 2 times daily. 24 g 0    doxepin (SINEQUAN) 10 MG capsule TK 1 OR 2 CS PO QHS PRN  1    fluticasone (FLONASE) 50 MCG/ACT nasal spray 1 spray by Nasal route daily 1 Bottle 5    albuterol sulfate HFA (VENTOLIN HFA) 108 (90 Base) MCG/ACT inhaler Inhale 2 puffs into the lungs every 6 hours as needed for Wheezing 1 Inhaler 3       I have reviewed all pertinent PMHx, PSHx, FamHx, SocialHx, medications, and allergies and updated history as appropriate. OBJECTIVE:    VS: /76 (Site: Left Arm, Position: Sitting, Cuff Size: Large Adult)   Pulse 86   Temp 97.7 °F (36.5 °C) (Oral)   Resp 16   Ht 5' 10\" (1.778 m)   Wt 198 lb 14.4 oz (90.2 kg)   BMI 28.54 kg/m²   General appearance: Alert, Awake, Oriented times 3, no distress  HEENT: no ulcer or whitish coating noticed  Lungs: Lungs clear to auscultation bilaterally. No rhonchi, crackles or wheezes  Heart: S1 S2  Regular rate and rhythm. No rub, murmur or gallop  Abdomen: Abdomen soft, non-tender. non-distended BS normal. No masses, organomegaly, no guarding rebound or rigidity. Extremities: No edema, Peripheral pulses palpable 2/4    ASSESSMENT/PLAN:    ? Coating at the back of tongue  Rinsing mouth with warm saline.    No alcohol     Folliculitis  Cont bactrim     IDDM, poorly controlled  humolog 6-6-6; lantus 20 nightly, Januvia 50 mg    I have reviewed my findings and recommendations with Ayaka Hernandez and Dr Elijah Gonzales MD PGY-1   8/30/2018 10:20 AM

## 2018-08-31 LAB
HBV SURFACE AB TITR SER: NORMAL {TITER}
HEPATITIS B SURFACE ANTIGEN INTERPRETATION: NORMAL
HEPATITIS C ANTIBODY INTERPRETATION: NORMAL

## 2018-09-10 ENCOUNTER — TELEPHONE (OUTPATIENT)
Dept: INTERNAL MEDICINE | Age: 38
End: 2018-09-10

## 2018-09-10 DIAGNOSIS — E11.65 TYPE 2 DIABETES MELLITUS WITH HYPERGLYCEMIA, WITH LONG-TERM CURRENT USE OF INSULIN (HCC): ICD-10-CM

## 2018-09-10 DIAGNOSIS — Z79.4 TYPE 2 DIABETES MELLITUS WITH HYPERGLYCEMIA, WITH LONG-TERM CURRENT USE OF INSULIN (HCC): ICD-10-CM

## 2018-09-10 RX ORDER — MUPIROCIN CALCIUM 20 MG/G
CREAM TOPICAL
Qty: 30 G | Refills: 0 | Status: SHIPPED | OUTPATIENT
Start: 2018-09-10 | End: 2018-11-05 | Stop reason: SDUPTHER

## 2018-09-10 NOTE — TELEPHONE ENCOUNTER
Pt called into . [facility], states that during his recent visit he was told that several scripts would be faxed to his pharmacy for pickup. Pt states that the only script that was sent was HUMALOG. Pt would like to know the status of the other scripts. Informed pt that message will be forwarded to clinical staff.      .Electronically signed by Juan Antonio Chen on 9/10/18 at 3:24 PM

## 2018-09-10 NOTE — TELEPHONE ENCOUNTER
Spoke with pt via phone regarding refill request. Pt notified medications were sent to [facility]. Pt states he does go between the pharmacy there and the [address] Pt instructed to call local pharmacy and have scripts transferred. Januvia was marked as no print and needs sent to pharmacy. Pt states he never got Bactroban. Asking if it can be reordered. Per last note pt has folliculitis.

## 2018-09-15 ENCOUNTER — HOSPITAL ENCOUNTER (EMERGENCY)
Age: 38
Discharge: HOME | End: 2018-09-15
Attending: EMERGENCY MEDICINE
Payer: MEDICARE

## 2018-09-15 VITALS
WEIGHT: 200 LBS | BODY MASS INDEX: 28 KG/M2 | RESPIRATION RATE: 16 BRPM | SYSTOLIC BLOOD PRESSURE: 138 MMHG | HEART RATE: 92 BPM | OXYGEN SATURATION: 98 % | HEIGHT: 71 IN | TEMPERATURE: 98.3 F | DIASTOLIC BLOOD PRESSURE: 88 MMHG

## 2018-09-15 DIAGNOSIS — M62.838 SPASM OF MUSCLE: Primary | ICD-10-CM

## 2018-09-15 DIAGNOSIS — R25.2 MUSCLE CRAMP: ICD-10-CM

## 2018-09-15 LAB
ANION GAP SERPL CALCULATED.3IONS-SCNC: 11 MMOL/L (ref 7–16)
BASOPHILS ABSOLUTE: 0.04 E9/L (ref 0–0.2)
BASOPHILS RELATIVE PERCENT: 0.7 % (ref 0–2)
BUN BLDV-MCNC: 17 MG/DL (ref 6–20)
CALCIUM SERPL-MCNC: 9.3 MG/DL (ref 8.6–10.2)
CHLORIDE BLD-SCNC: 95 MMOL/L (ref 98–107)
CO2: 27 MMOL/L (ref 22–29)
CREAT SERPL-MCNC: 1 MG/DL (ref 0.7–1.2)
EOSINOPHILS ABSOLUTE: 0.18 E9/L (ref 0.05–0.5)
EOSINOPHILS RELATIVE PERCENT: 3.2 % (ref 0–6)
GFR AFRICAN AMERICAN: >60
GFR NON-AFRICAN AMERICAN: >60 ML/MIN/1.73
GLUCOSE BLD-MCNC: 458 MG/DL (ref 74–109)
HCT VFR BLD CALC: 41.1 % (ref 37–54)
HEMOGLOBIN: 13.9 G/DL (ref 12.5–16.5)
IMMATURE GRANULOCYTES #: 0.04 E9/L
IMMATURE GRANULOCYTES %: 0.7 % (ref 0–5)
LYMPHOCYTES ABSOLUTE: 1.25 E9/L (ref 1.5–4)
LYMPHOCYTES RELATIVE PERCENT: 22.2 % (ref 20–42)
MCH RBC QN AUTO: 27.4 PG (ref 26–35)
MCHC RBC AUTO-ENTMCNC: 33.8 % (ref 32–34.5)
MCV RBC AUTO: 81.1 FL (ref 80–99.9)
MONOCYTES ABSOLUTE: 0.35 E9/L (ref 0.1–0.95)
MONOCYTES RELATIVE PERCENT: 6.2 % (ref 2–12)
NEUTROPHILS ABSOLUTE: 3.77 E9/L (ref 1.8–7.3)
NEUTROPHILS RELATIVE PERCENT: 67 % (ref 43–80)
PDW BLD-RTO: 12.4 FL (ref 11.5–15)
PLATELET # BLD: 229 E9/L (ref 130–450)
PMV BLD AUTO: 10.4 FL (ref 7–12)
POTASSIUM SERPL-SCNC: 4.2 MMOL/L (ref 3.5–5)
RBC # BLD: 5.07 E12/L (ref 3.8–5.8)
SODIUM BLD-SCNC: 133 MMOL/L (ref 132–146)
WBC # BLD: 5.6 E9/L (ref 4.5–11.5)

## 2018-09-15 PROCEDURE — 85025 COMPLETE CBC W/AUTO DIFF WBC: CPT

## 2018-09-15 PROCEDURE — 99283 EMERGENCY DEPT VISIT LOW MDM: CPT

## 2018-09-15 PROCEDURE — 80048 BASIC METABOLIC PNL TOTAL CA: CPT

## 2018-09-15 PROCEDURE — 36415 COLL VENOUS BLD VENIPUNCTURE: CPT

## 2018-09-15 ASSESSMENT — PAIN DESCRIPTION - ORIENTATION
ORIENTATION: RIGHT;LEFT
ORIENTATION: RIGHT;LEFT

## 2018-09-15 ASSESSMENT — PAIN DESCRIPTION - LOCATION
LOCATION: LEG
LOCATION: LEG

## 2018-09-15 ASSESSMENT — PAIN DESCRIPTION - DESCRIPTORS
DESCRIPTORS: ACHING
DESCRIPTORS: ACHING

## 2018-09-15 ASSESSMENT — PAIN SCALES - GENERAL: PAINLEVEL_OUTOF10: 9

## 2018-09-15 NOTE — ED NOTES
Patient c/o bilateral leg pain and states his muscles are acting up, patient states he has had some ongoing issues with muscles and seen by neurology , states he is being worked up for lupus, patient bilateral lower legs warm to touch no swelling noted      Marilynn Garcia RN  09/15/18 1920

## 2018-09-19 ENCOUNTER — HOSPITAL ENCOUNTER (EMERGENCY)
Age: 38
Discharge: HOME | End: 2018-09-19
Payer: MEDICARE

## 2018-09-19 VITALS
HEIGHT: 70 IN | DIASTOLIC BLOOD PRESSURE: 82 MMHG | SYSTOLIC BLOOD PRESSURE: 110 MMHG | HEART RATE: 112 BPM | TEMPERATURE: 98.6 F | BODY MASS INDEX: 27.92 KG/M2 | OXYGEN SATURATION: 98 % | RESPIRATION RATE: 16 BRPM | WEIGHT: 195 LBS

## 2018-09-19 DIAGNOSIS — Z71.1 PERSON WITH FEARED COMPLAINT, NO DIAGNOSIS MADE: Primary | ICD-10-CM

## 2018-09-19 PROCEDURE — 99283 EMERGENCY DEPT VISIT LOW MDM: CPT

## 2018-09-20 NOTE — ED NOTES
Pt states he was working in the yard around dirt & dust & spit up what he thinks may be a maggot. Small white rand noted in container. Denies abd discomfort, emesis or diarrhea or resp difficulties. Pt also states his knuckles are turning red. several mcps on both hands noted to be red- no pain or discomfort     Vince Yu RN  09/19/18 1723

## 2018-09-22 NOTE — ED PROVIDER NOTES
[facility]         Department of Emergency Medicine   ED  Provider Note  Admit Date/RoomTime: 9/19/2018  9:04 PM  ED Room: 06/06  MRN: [id number]  Chief Complaint: Other (working in the yard today and coughed up what looked like a maggot, wants it checked, feels fine)       History of Present Illness   Source of history provided by:  patient. History/Exam Limitations: none. Jennie Bojorquez is a 43 y.o. male who has a past medical history of:   Past Medical History:   Diagnosis Date    Asthma     Diabetes mellitus ([address]     Hypertension     presents to the ED by private car and is alone for coughing up a \"chunk of something white\", beginning several hours ago and are resolved since that time. The complaint has been intermittent, mild in severity. He states he was doing some yard work and had an episode of coughing and brought up a small solid piece of something white 2 mm x 3 mm. No other fever, chills, nausea, vomiting, abdominal pain, or URI symptoms. ROS    Pertinent positives and negatives are stated within HPI, all other systems reviewed and are negative. History reviewed. No pertinent surgical history. Social History:  reports that he quit smoking about 2 years ago. He has a 4.00 pack-year smoking history. He has never used smokeless tobacco. He reports that he does not drink alcohol or use drugs. Family History: family history includes Heart Attack in his father; Lupus in his mother. Allergies: Milk-related compounds    Physical Exam   Oxygen Saturation Interpretation: Normal.   ED Triage Vitals   BP Temp Temp Source Pulse Resp SpO2 Height Weight   09/19/18 2106 09/19/18 2106 09/19/18 2106 09/19/18 2106 09/19/18 2106 09/19/18 2106 09/19/18 2102 09/19/18 2102   110/82 98.6 °F (37 °C) Oral 112 16 98 % 5' 10\" (1.778 m) 195 lb (88.5 kg)       Physical Exam  · Constitutional/General: Alert and oriented x3, well appearing, non toxic  · HEENT:  NC/NT. PERRLA,  Airway patent.   · Respiratory: Lungs

## 2018-10-24 ENCOUNTER — OFFICE VISIT (OUTPATIENT)
Dept: INTERNAL MEDICINE | Age: 38
End: 2018-10-24
Payer: MEDICARE

## 2018-10-24 VITALS
HEIGHT: 71 IN | SYSTOLIC BLOOD PRESSURE: 110 MMHG | DIASTOLIC BLOOD PRESSURE: 80 MMHG | HEART RATE: 93 BPM | RESPIRATION RATE: 16 BRPM | TEMPERATURE: 97.9 F | WEIGHT: 197.9 LBS | BODY MASS INDEX: 27.71 KG/M2

## 2018-10-24 DIAGNOSIS — E11.65 TYPE 2 DIABETES MELLITUS WITH HYPERGLYCEMIA, WITH LONG-TERM CURRENT USE OF INSULIN (HCC): Primary | ICD-10-CM

## 2018-10-24 DIAGNOSIS — Z91.199 NONADHERENCE TO MEDICAL TREATMENT: ICD-10-CM

## 2018-10-24 DIAGNOSIS — Z79.4 TYPE 2 DIABETES MELLITUS WITH HYPERGLYCEMIA, WITH LONG-TERM CURRENT USE OF INSULIN (HCC): Primary | ICD-10-CM

## 2018-10-24 LAB
BILIRUBIN, POC: NORMAL
BLOOD URINE, POC: NORMAL
CLARITY, POC: NORMAL
COLOR, POC: NORMAL
GLUCOSE BLD-MCNC: 416 MG/DL
GLUCOSE URINE, POC: NORMAL
HBA1C MFR BLD: 12.1 %
KETONES, POC: NORMAL
LEUKOCYTE EST, POC: NORMAL
NITRITE, POC: NORMAL
PH, POC: 6
PROTEIN, POC: NORMAL
SPECIFIC GRAVITY, POC: 1
UROBILINOGEN, POC: NORMAL

## 2018-10-24 PROCEDURE — 81002 URINALYSIS NONAUTO W/O SCOPE: CPT | Performed by: INTERNAL MEDICINE

## 2018-10-24 PROCEDURE — G8417 CALC BMI ABV UP PARAM F/U: HCPCS | Performed by: INTERNAL MEDICINE

## 2018-10-24 PROCEDURE — 82962 GLUCOSE BLOOD TEST: CPT | Performed by: INTERNAL MEDICINE

## 2018-10-24 PROCEDURE — 99213 OFFICE O/P EST LOW 20 MIN: CPT | Performed by: INTERNAL MEDICINE

## 2018-10-24 PROCEDURE — 1036F TOBACCO NON-USER: CPT | Performed by: INTERNAL MEDICINE

## 2018-10-24 PROCEDURE — 99212 OFFICE O/P EST SF 10 MIN: CPT | Performed by: INTERNAL MEDICINE

## 2018-10-24 PROCEDURE — 3046F HEMOGLOBIN A1C LEVEL >9.0%: CPT | Performed by: INTERNAL MEDICINE

## 2018-10-24 PROCEDURE — G8484 FLU IMMUNIZE NO ADMIN: HCPCS | Performed by: INTERNAL MEDICINE

## 2018-10-24 PROCEDURE — 2022F DILAT RTA XM EVC RTNOPTHY: CPT | Performed by: INTERNAL MEDICINE

## 2018-10-24 PROCEDURE — G8427 DOCREV CUR MEDS BY ELIG CLIN: HCPCS | Performed by: INTERNAL MEDICINE

## 2018-10-24 PROCEDURE — 83036 HEMOGLOBIN GLYCOSYLATED A1C: CPT | Performed by: INTERNAL MEDICINE

## 2018-10-24 RX ORDER — INSULIN GLARGINE 100 [IU]/ML
20 INJECTION, SOLUTION SUBCUTANEOUS NIGHTLY
Qty: 5 VIAL | Refills: 5 | Status: SHIPPED | OUTPATIENT
Start: 2018-10-24 | End: 2018-11-07

## 2018-10-24 RX ORDER — BLOOD-GLUCOSE METER
KIT MISCELLANEOUS
Qty: 1 KIT | Refills: 0 | Status: SHIPPED | OUTPATIENT
Start: 2018-10-24 | End: 2018-10-24 | Stop reason: CLARIF

## 2018-10-24 RX ORDER — GLUCOSAMINE HCL/CHONDROITIN SU 500-400 MG
CAPSULE ORAL
Qty: 100 STRIP | Refills: 2 | Status: SHIPPED | OUTPATIENT
Start: 2018-10-24 | End: 2018-10-24 | Stop reason: CLARIF

## 2018-10-24 RX ORDER — GLUCOSAMINE HCL/CHONDROITIN SU 500-400 MG
CAPSULE ORAL
Qty: 100 STRIP | Refills: 2 | Status: SHIPPED | OUTPATIENT
Start: 2018-10-24 | End: 2018-11-05 | Stop reason: SDUPTHER

## 2018-10-24 RX ORDER — GLUCOSAMINE HCL/CHONDROITIN SU 500-400 MG
CAPSULE ORAL
Qty: 100 STRIP | Refills: 2 | Status: SHIPPED | OUTPATIENT
Start: 2018-10-24 | End: 2019-08-14 | Stop reason: SDUPTHER

## 2018-10-24 RX ORDER — INSULIN GLARGINE 100 [IU]/ML
20 INJECTION, SOLUTION SUBCUTANEOUS NIGHTLY
Qty: 5 VIAL | Refills: 5 | Status: SHIPPED | OUTPATIENT
Start: 2018-10-24 | End: 2018-10-24 | Stop reason: CLARIF

## 2018-10-24 RX ORDER — INSULIN GLARGINE 100 [IU]/ML
20 INJECTION, SOLUTION SUBCUTANEOUS NIGHTLY
Qty: 5 VIAL | Refills: 5 | Status: SHIPPED | OUTPATIENT
Start: 2018-10-24 | End: 2018-12-19 | Stop reason: SDUPTHER

## 2018-10-24 RX ORDER — BLOOD-GLUCOSE METER
KIT MISCELLANEOUS
Qty: 1 KIT | Refills: 0 | Status: SHIPPED | OUTPATIENT
Start: 2018-10-24 | End: 2018-11-05 | Stop reason: SDUPTHER

## 2018-10-24 ASSESSMENT — ENCOUNTER SYMPTOMS
SHORTNESS OF BREATH: 0
SORE THROAT: 0
BACK PAIN: 0
DIARRHEA: 0
CONSTIPATION: 0
WHEEZING: 0

## 2018-10-24 NOTE — PROGRESS NOTES
Discharge instructions reviewed with patient per Dr. Karey Spears. Patient directed to  to  AVS and schedule follow up appt.

## 2018-10-28 ENCOUNTER — HOSPITAL ENCOUNTER (EMERGENCY)
Age: 38
Discharge: HOME | End: 2018-10-28
Attending: EMERGENCY MEDICINE
Payer: MEDICARE

## 2018-10-28 ENCOUNTER — APPOINTMENT (OUTPATIENT)
Dept: GENERAL RADIOLOGY | Age: 38
End: 2018-10-28
Payer: MEDICARE

## 2018-10-28 VITALS
RESPIRATION RATE: 18 BRPM | BODY MASS INDEX: 27.58 KG/M2 | SYSTOLIC BLOOD PRESSURE: 124 MMHG | HEIGHT: 71 IN | OXYGEN SATURATION: 100 % | DIASTOLIC BLOOD PRESSURE: 88 MMHG | WEIGHT: 197 LBS | TEMPERATURE: 97.2 F | HEART RATE: 88 BPM

## 2018-10-28 DIAGNOSIS — R73.9 HYPERGLYCEMIA: Primary | ICD-10-CM

## 2018-10-28 LAB
ALBUMIN SERPL-MCNC: 4.2 G/DL (ref 3.5–5.2)
ALP BLD-CCNC: 113 U/L (ref 40–129)
ALT SERPL-CCNC: 15 U/L (ref 0–40)
ANION GAP SERPL CALCULATED.3IONS-SCNC: 11 MMOL/L (ref 7–16)
AST SERPL-CCNC: 14 U/L (ref 0–39)
BASOPHILS ABSOLUTE: 0.03 E9/L (ref 0–0.2)
BASOPHILS RELATIVE PERCENT: 0.6 % (ref 0–2)
BETA-HYDROXYBUTYRATE: 0.67 MMOL/L (ref 0.02–0.27)
BILIRUB SERPL-MCNC: 0.9 MG/DL (ref 0–1.2)
BILIRUBIN URINE: NEGATIVE
BLOOD, URINE: NEGATIVE
BUN BLDV-MCNC: 12 MG/DL (ref 6–20)
CALCIUM SERPL-MCNC: 9.6 MG/DL (ref 8.6–10.2)
CHLORIDE BLD-SCNC: 96 MMOL/L (ref 98–107)
CHP ED QC CHECK: YES
CHP ED QC CHECK: YES
CLARITY: CLEAR
CO2: 28 MMOL/L (ref 22–29)
COLOR: YELLOW
CREAT SERPL-MCNC: 0.9 MG/DL (ref 0.7–1.2)
EOSINOPHILS ABSOLUTE: 0.14 E9/L (ref 0.05–0.5)
EOSINOPHILS RELATIVE PERCENT: 2.7 % (ref 0–6)
GFR AFRICAN AMERICAN: >60
GFR NON-AFRICAN AMERICAN: >60 ML/MIN/1.73
GLUCOSE BLD-MCNC: 247 MG/DL
GLUCOSE BLD-MCNC: 306 MG/DL
GLUCOSE BLD-MCNC: 318 MG/DL (ref 74–109)
GLUCOSE URINE: >=1000 MG/DL
HCT VFR BLD CALC: 45.3 % (ref 37–54)
HEMOGLOBIN: 15.9 G/DL (ref 12.5–16.5)
IMMATURE GRANULOCYTES #: 0.01 E9/L
IMMATURE GRANULOCYTES %: 0.2 % (ref 0–5)
KETONES, URINE: 15 MG/DL
LACTIC ACID: 1.2 MMOL/L (ref 0.5–2.2)
LEUKOCYTE ESTERASE, URINE: NEGATIVE
LIPASE: 19 U/L (ref 13–60)
LYMPHOCYTES ABSOLUTE: 1.07 E9/L (ref 1.5–4)
LYMPHOCYTES RELATIVE PERCENT: 20.4 % (ref 20–42)
MCH RBC QN AUTO: 28.4 PG (ref 26–35)
MCHC RBC AUTO-ENTMCNC: 35.1 % (ref 32–34.5)
MCV RBC AUTO: 80.9 FL (ref 80–99.9)
METER GLUCOSE: 247 MG/DL (ref 70–110)
METER GLUCOSE: 306 MG/DL (ref 70–110)
MONOCYTES ABSOLUTE: 0.26 E9/L (ref 0.1–0.95)
MONOCYTES RELATIVE PERCENT: 5 % (ref 2–12)
NEUTROPHILS ABSOLUTE: 3.74 E9/L (ref 1.8–7.3)
NEUTROPHILS RELATIVE PERCENT: 71.1 % (ref 43–80)
NITRITE, URINE: NEGATIVE
PDW BLD-RTO: 12.4 FL (ref 11.5–15)
PH UA: 6.5 (ref 5–9)
PH VENOUS: 7.35 (ref 7.3–7.42)
PLATELET # BLD: 304 E9/L (ref 130–450)
PMV BLD AUTO: 10.4 FL (ref 7–12)
POTASSIUM SERPL-SCNC: 5 MMOL/L (ref 3.5–5)
PROTEIN UA: NEGATIVE MG/DL
RBC # BLD: 5.6 E12/L (ref 3.8–5.8)
SODIUM BLD-SCNC: 135 MMOL/L (ref 132–146)
SPECIFIC GRAVITY UA: 1.01 (ref 1–1.03)
TOTAL PROTEIN: 7.2 G/DL (ref 6.4–8.3)
TROPONIN: <0.01 NG/ML (ref 0–0.03)
UROBILINOGEN, URINE: 0.2 E.U./DL
WBC # BLD: 5.3 E9/L (ref 4.5–11.5)

## 2018-10-28 PROCEDURE — 99284 EMERGENCY DEPT VISIT MOD MDM: CPT

## 2018-10-28 PROCEDURE — 96372 THER/PROPH/DIAG INJ SC/IM: CPT

## 2018-10-28 PROCEDURE — 6360000002 HC RX W HCPCS: Performed by: EMERGENCY MEDICINE

## 2018-10-28 PROCEDURE — 81003 URINALYSIS AUTO W/O SCOPE: CPT

## 2018-10-28 PROCEDURE — 84484 ASSAY OF TROPONIN QUANT: CPT

## 2018-10-28 PROCEDURE — 85025 COMPLETE CBC W/AUTO DIFF WBC: CPT

## 2018-10-28 PROCEDURE — 82962 GLUCOSE BLOOD TEST: CPT

## 2018-10-28 PROCEDURE — 93005 ELECTROCARDIOGRAM TRACING: CPT | Performed by: NURSE PRACTITIONER

## 2018-10-28 PROCEDURE — 2580000003 HC RX 258: Performed by: EMERGENCY MEDICINE

## 2018-10-28 PROCEDURE — 80053 COMPREHEN METABOLIC PANEL: CPT

## 2018-10-28 PROCEDURE — 83690 ASSAY OF LIPASE: CPT

## 2018-10-28 PROCEDURE — 6370000000 HC RX 637 (ALT 250 FOR IP): Performed by: EMERGENCY MEDICINE

## 2018-10-28 PROCEDURE — 83605 ASSAY OF LACTIC ACID: CPT

## 2018-10-28 PROCEDURE — 36415 COLL VENOUS BLD VENIPUNCTURE: CPT

## 2018-10-28 PROCEDURE — 82010 KETONE BODYS QUAN: CPT

## 2018-10-28 PROCEDURE — 74022 RADEX COMPL AQT ABD SERIES: CPT

## 2018-10-28 PROCEDURE — 82800 BLOOD PH: CPT

## 2018-10-28 RX ORDER — PROMETHAZINE HYDROCHLORIDE 25 MG/ML
12.5 INJECTION, SOLUTION INTRAMUSCULAR; INTRAVENOUS ONCE
Status: COMPLETED | OUTPATIENT
Start: 2018-10-28 | End: 2018-10-28

## 2018-10-28 RX ORDER — 0.9 % SODIUM CHLORIDE 0.9 %
1000 INTRAVENOUS SOLUTION INTRAVENOUS ONCE
Status: COMPLETED | OUTPATIENT
Start: 2018-10-28 | End: 2018-10-28

## 2018-10-28 RX ADMIN — SODIUM CHLORIDE 1000 ML: 9 INJECTION, SOLUTION INTRAVENOUS at 13:39

## 2018-10-28 RX ADMIN — INSULIN HUMAN 10 UNITS: 100 INJECTION, SOLUTION PARENTERAL at 14:45

## 2018-10-28 RX ADMIN — PROMETHAZINE HYDROCHLORIDE 12.5 MG: 25 INJECTION INTRAMUSCULAR; INTRAVENOUS at 13:43

## 2018-10-28 NOTE — ED PROVIDER NOTES
Milk-related compounds      ---------------------------------------------------PHYSICAL EXAM--------------------------------------    Constitutional/General: Alert and oriented x3  Head: Normocephalic and atraumatic  Eyes: PERRL, EOMI  Mouth: Oropharynx clear, handling secretions, mucous membranes are normal.   Neck: Supple, full ROM  Respiratory: Lungs clear to auscultation bilaterally, no wheezes, rales, or rhonchi. Not in respiratory distress  Cardiovascular:  Regular rate. Regular rhythm. No murmurs, gallops, or rubs. 2+ distal pulses  Chest: No chest wall tenderness  GI:  Abdomen Soft, Non tender, Non distended. +BS. No rebound, guarding, or rigidity. No pulsatile masses. Musculoskeletal: Moves all extremities x 4. Warm and well perfused, no edema. Capillary refill <3 seconds  Integument: skin warm and dry. No rashes. Neurologic: GCS 15, no focal deficits, symmetric strength 5/5 in the upper and lower extremities bilaterally  Psychiatric: Normal Affect      -------------------------------------------------- RESULTS -------------------------------------------------  I have personally reviewed all laboratory and imaging results for this patient. Results are listed below.      LABS:  Results for orders placed or performed during the hospital encounter of 10/28/18   CBC Auto Differential   Result Value Ref Range    WBC 5.3 4.5 - 11.5 E9/L    RBC 5.60 3.80 - 5.80 E12/L    Hemoglobin 15.9 12.5 - 16.5 g/dL    Hematocrit 45.3 37.0 - 54.0 %    MCV 80.9 80.0 - 99.9 fL    MCH 28.4 26.0 - 35.0 pg    MCHC 35.1 (H) 32.0 - 34.5 %    RDW 12.4 11.5 - 15.0 fL    Platelets 927 713 - 584 E9/L    MPV 10.4 7.0 - 12.0 fL    Neutrophils % 71.1 43.0 - 80.0 %    Immature Granulocytes % 0.2 0.0 - 5.0 %    Lymphocytes % 20.4 20.0 - 42.0 %    Monocytes % 5.0 2.0 - 12.0 %    Eosinophils % 2.7 0.0 - 6.0 %    Basophils % 0.6 0.0 - 2.0 %    Neutrophils # 3.74 1.80 - 7.30 E9/L    Immature Granulocytes # 0.01 E9/L    Lymphocytes # 1.07 (L) Ventricular Rate 86 BPM    Atrial Rate 86 BPM    P-R Interval 180 ms    QRS Duration 94 ms    Q-T Interval 360 ms    QTc Calculation (Bazett) 430 ms    P Axis 47 degrees    R Axis -48 degrees    T Axis 33 degrees       RADIOLOGY:  Interpreted by Radiologist.  XR Acute Abd Series Chest 1 VW   Final Result   Normal chest with no evidence of free intraperitoneal air beneath the   diaphragm. Moderate prominence of right hemicolonic fecal content and minimal   prominence of left lower abdominal small bowel gas without evidence of   obstructive dilation or other acute findings. There is a calcific density in the right pelvis overlying the sacrum,   which is of uncertain significance. EKG:  This EKG is signed and interpreted by the EP. Time: 1341  Rate: 86  Rhythm: normal sinus   Interpretation: NSR without any ST elevation or depression. No TWI. Normal intervals  Comparison: stable as compared to patient's most recent EKG    ------------------------- NURSING NOTES AND VITALS REVIEWED ---------------------------   The nursing notes within the ED encounter and vital signs as below have been reviewed by myself. /88   Pulse 88   Temp 97.2 °F (36.2 °C) (Oral)   Resp 18   Ht 5' 11\" (1.803 m)   Wt 197 lb (89.4 kg)   SpO2 100%   BMI 27.48 kg/m²   Oxygen Saturation Interpretation: Normal    The patients available past medical records and past encounters were reviewed. ------------------------------ ED COURSE/MEDICAL DECISION MAKING----------------------  Medications   0.9 % sodium chloride bolus (0 mLs Intravenous Stopped 10/28/18 1557)   promethazine (PHENERGAN) injection 12.5 mg (12.5 mg Intramuscular Given 10/28/18 1343)   insulin regular (HUMULIN R;NOVOLIN R) injection 10 Units (10 Units Subcutaneous Given 10/28/18 1445)       Medical Decision Making: Polly Narvaez presents with dizziness, malaise, and nausea. He was given IVF, phenergan, and insulin. EKG stable.  CMP normal.

## 2018-10-31 LAB
EKG ATRIAL RATE: 86 BPM
EKG P AXIS: 47 DEGREES
EKG P-R INTERVAL: 180 MS
EKG Q-T INTERVAL: 360 MS
EKG QRS DURATION: 94 MS
EKG QTC CALCULATION (BAZETT): 430 MS
EKG R AXIS: -48 DEGREES
EKG T AXIS: 33 DEGREES
EKG VENTRICULAR RATE: 86 BPM

## 2018-11-04 ENCOUNTER — HOSPITAL ENCOUNTER (EMERGENCY)
Age: 38
Discharge: HOME | End: 2018-11-05
Attending: EMERGENCY MEDICINE
Payer: MEDICARE

## 2018-11-04 DIAGNOSIS — R73.9 HYPERGLYCEMIA: Primary | ICD-10-CM

## 2018-11-04 LAB — METER GLUCOSE: 398 MG/DL (ref 70–110)

## 2018-11-04 PROCEDURE — 2580000003 HC RX 258: Performed by: STUDENT IN AN ORGANIZED HEALTH CARE EDUCATION/TRAINING PROGRAM

## 2018-11-04 PROCEDURE — 85025 COMPLETE CBC W/AUTO DIFF WBC: CPT

## 2018-11-04 PROCEDURE — 82962 GLUCOSE BLOOD TEST: CPT

## 2018-11-04 PROCEDURE — 82010 KETONE BODYS QUAN: CPT

## 2018-11-04 PROCEDURE — 80048 BASIC METABOLIC PNL TOTAL CA: CPT

## 2018-11-04 PROCEDURE — 81003 URINALYSIS AUTO W/O SCOPE: CPT

## 2018-11-04 PROCEDURE — 36415 COLL VENOUS BLD VENIPUNCTURE: CPT

## 2018-11-04 PROCEDURE — 99284 EMERGENCY DEPT VISIT MOD MDM: CPT

## 2018-11-04 RX ORDER — 0.9 % SODIUM CHLORIDE 0.9 %
1000 INTRAVENOUS SOLUTION INTRAVENOUS ONCE
Status: COMPLETED | OUTPATIENT
Start: 2018-11-04 | End: 2018-11-05

## 2018-11-04 RX ADMIN — SODIUM CHLORIDE 1000 ML: 9 INJECTION, SOLUTION INTRAVENOUS at 23:42

## 2018-11-04 ASSESSMENT — PAIN DESCRIPTION - DESCRIPTORS: DESCRIPTORS: ACHING

## 2018-11-04 ASSESSMENT — PAIN DESCRIPTION - FREQUENCY: FREQUENCY: CONTINUOUS

## 2018-11-04 ASSESSMENT — PAIN DESCRIPTION - LOCATION: LOCATION: HEAD

## 2018-11-04 ASSESSMENT — PAIN SCALES - GENERAL: PAINLEVEL_OUTOF10: 2

## 2018-11-04 ASSESSMENT — PAIN DESCRIPTION - PAIN TYPE: TYPE: ACUTE PAIN

## 2018-11-05 ENCOUNTER — OFFICE VISIT (OUTPATIENT)
Dept: INTERNAL MEDICINE | Age: 38
End: 2018-11-05
Payer: MEDICARE

## 2018-11-05 VITALS
TEMPERATURE: 97.8 F | HEART RATE: 81 BPM | SYSTOLIC BLOOD PRESSURE: 111 MMHG | HEIGHT: 70 IN | BODY MASS INDEX: 28.63 KG/M2 | RESPIRATION RATE: 16 BRPM | WEIGHT: 200 LBS | DIASTOLIC BLOOD PRESSURE: 75 MMHG

## 2018-11-05 VITALS
SYSTOLIC BLOOD PRESSURE: 133 MMHG | DIASTOLIC BLOOD PRESSURE: 83 MMHG | BODY MASS INDEX: 28 KG/M2 | TEMPERATURE: 98.6 F | WEIGHT: 200 LBS | HEART RATE: 94 BPM | HEIGHT: 71 IN | OXYGEN SATURATION: 97 % | RESPIRATION RATE: 16 BRPM

## 2018-11-05 DIAGNOSIS — E11.65 TYPE 2 DIABETES MELLITUS WITH HYPERGLYCEMIA, WITH LONG-TERM CURRENT USE OF INSULIN (HCC): Primary | ICD-10-CM

## 2018-11-05 DIAGNOSIS — Z79.4 TYPE 2 DIABETES MELLITUS WITH HYPERGLYCEMIA, WITH LONG-TERM CURRENT USE OF INSULIN (HCC): Primary | ICD-10-CM

## 2018-11-05 LAB
ANION GAP SERPL CALCULATED.3IONS-SCNC: 14 MMOL/L (ref 7–16)
BASOPHILS ABSOLUTE: 0.04 E9/L (ref 0–0.2)
BASOPHILS RELATIVE PERCENT: 0.5 % (ref 0–2)
BETA-HYDROXYBUTYRATE: 0.36 MMOL/L (ref 0.02–0.27)
BILIRUBIN URINE: NEGATIVE
BLOOD, URINE: NEGATIVE
BUN BLDV-MCNC: 14 MG/DL (ref 6–20)
CALCIUM SERPL-MCNC: 9.2 MG/DL (ref 8.6–10.2)
CHLORIDE BLD-SCNC: 96 MMOL/L (ref 98–107)
CLARITY: CLEAR
CO2: 25 MMOL/L (ref 22–29)
COLOR: YELLOW
CREAT SERPL-MCNC: 0.9 MG/DL (ref 0.7–1.2)
EOSINOPHILS ABSOLUTE: 0.14 E9/L (ref 0.05–0.5)
EOSINOPHILS RELATIVE PERCENT: 1.8 % (ref 0–6)
GFR AFRICAN AMERICAN: >60
GFR NON-AFRICAN AMERICAN: >60 ML/MIN/1.73
GLUCOSE BLD-MCNC: 431 MG/DL (ref 74–99)
GLUCOSE URINE: >=1000 MG/DL
HCT VFR BLD CALC: 41.2 % (ref 37–54)
HEMOGLOBIN: 14.1 G/DL (ref 12.5–16.5)
IMMATURE GRANULOCYTES #: 0.02 E9/L
IMMATURE GRANULOCYTES %: 0.3 % (ref 0–5)
KETONES, URINE: 15 MG/DL
LEUKOCYTE ESTERASE, URINE: NEGATIVE
LYMPHOCYTES ABSOLUTE: 2.2 E9/L (ref 1.5–4)
LYMPHOCYTES RELATIVE PERCENT: 28.2 % (ref 20–42)
MCH RBC QN AUTO: 27.7 PG (ref 26–35)
MCHC RBC AUTO-ENTMCNC: 34.2 % (ref 32–34.5)
MCV RBC AUTO: 80.9 FL (ref 80–99.9)
MONOCYTES ABSOLUTE: 0.49 E9/L (ref 0.1–0.95)
MONOCYTES RELATIVE PERCENT: 6.3 % (ref 2–12)
NEUTROPHILS ABSOLUTE: 4.92 E9/L (ref 1.8–7.3)
NEUTROPHILS RELATIVE PERCENT: 62.9 % (ref 43–80)
NITRITE, URINE: NEGATIVE
PDW BLD-RTO: 12.1 FL (ref 11.5–15)
PH UA: 5.5 (ref 5–9)
PH VENOUS: 7.41 (ref 7.3–7.42)
PLATELET # BLD: 283 E9/L (ref 130–450)
PMV BLD AUTO: 10.6 FL (ref 7–12)
POTASSIUM REFLEX MAGNESIUM: 4.1 MMOL/L (ref 3.5–5)
PROTEIN UA: NEGATIVE MG/DL
RBC # BLD: 5.09 E12/L (ref 3.8–5.8)
SODIUM BLD-SCNC: 135 MMOL/L (ref 132–146)
SPECIFIC GRAVITY UA: 1.01 (ref 1–1.03)
UROBILINOGEN, URINE: 0.2 E.U./DL
WBC # BLD: 7.8 E9/L (ref 4.5–11.5)

## 2018-11-05 PROCEDURE — 96372 THER/PROPH/DIAG INJ SC/IM: CPT

## 2018-11-05 PROCEDURE — G8484 FLU IMMUNIZE NO ADMIN: HCPCS | Performed by: INTERNAL MEDICINE

## 2018-11-05 PROCEDURE — 3046F HEMOGLOBIN A1C LEVEL >9.0%: CPT | Performed by: INTERNAL MEDICINE

## 2018-11-05 PROCEDURE — 2580000003 HC RX 258: Performed by: EMERGENCY MEDICINE

## 2018-11-05 PROCEDURE — 99212 OFFICE O/P EST SF 10 MIN: CPT | Performed by: INTERNAL MEDICINE

## 2018-11-05 PROCEDURE — 1036F TOBACCO NON-USER: CPT | Performed by: INTERNAL MEDICINE

## 2018-11-05 PROCEDURE — 6370000000 HC RX 637 (ALT 250 FOR IP): Performed by: EMERGENCY MEDICINE

## 2018-11-05 PROCEDURE — 82800 BLOOD PH: CPT

## 2018-11-05 PROCEDURE — 2022F DILAT RTA XM EVC RTNOPTHY: CPT | Performed by: INTERNAL MEDICINE

## 2018-11-05 PROCEDURE — G8417 CALC BMI ABV UP PARAM F/U: HCPCS | Performed by: INTERNAL MEDICINE

## 2018-11-05 PROCEDURE — G8427 DOCREV CUR MEDS BY ELIG CLIN: HCPCS | Performed by: INTERNAL MEDICINE

## 2018-11-05 PROCEDURE — 99213 OFFICE O/P EST LOW 20 MIN: CPT | Performed by: INTERNAL MEDICINE

## 2018-11-05 PROCEDURE — 36415 COLL VENOUS BLD VENIPUNCTURE: CPT

## 2018-11-05 RX ORDER — BLOOD-GLUCOSE METER
KIT MISCELLANEOUS
Qty: 1 KIT | Refills: 0 | Status: SHIPPED | OUTPATIENT
Start: 2018-11-05 | End: 2019-01-22 | Stop reason: SDUPTHER

## 2018-11-05 RX ORDER — INSULIN GLARGINE 100 [IU]/ML
20 INJECTION, SOLUTION SUBCUTANEOUS ONCE
Status: COMPLETED | OUTPATIENT
Start: 2018-11-05 | End: 2018-11-05

## 2018-11-05 RX ORDER — GLUCOSAMINE HCL/CHONDROITIN SU 500-400 MG
CAPSULE ORAL
Qty: 100 STRIP | Refills: 2 | Status: SHIPPED | OUTPATIENT
Start: 2018-11-05 | End: 2018-12-19 | Stop reason: SDUPTHER

## 2018-11-05 RX ORDER — MUPIROCIN CALCIUM 20 MG/G
CREAM TOPICAL
Qty: 30 G | Refills: 2 | Status: SHIPPED | OUTPATIENT
Start: 2018-11-05 | End: 2018-12-05

## 2018-11-05 RX ORDER — 0.9 % SODIUM CHLORIDE 0.9 %
1000 INTRAVENOUS SOLUTION INTRAVENOUS ONCE
Status: COMPLETED | OUTPATIENT
Start: 2018-11-05 | End: 2018-11-05

## 2018-11-05 RX ADMIN — INSULIN GLARGINE 20 UNITS: 100 INJECTION, SOLUTION SUBCUTANEOUS at 00:56

## 2018-11-05 RX ADMIN — INSULIN HUMAN 10 UNITS: 100 INJECTION, SOLUTION PARENTERAL at 00:55

## 2018-11-05 RX ADMIN — SODIUM CHLORIDE 1000 ML: 9 INJECTION, SOLUTION INTRAVENOUS at 00:30

## 2018-11-05 ASSESSMENT — ENCOUNTER SYMPTOMS
RESPIRATORY NEGATIVE: 1
SHORTNESS OF BREATH: 0
NAUSEA: 0
EYE REDNESS: 0
EYES NEGATIVE: 1
COUGH: 0
EYE PAIN: 0
EYE DISCHARGE: 0
VOMITING: 0
GASTROINTESTINAL NEGATIVE: 1
DIARRHEA: 0
BACK PAIN: 0
SINUS PRESSURE: 0
SORE THROAT: 0
WHEEZING: 0
ABDOMINAL PAIN: 0

## 2018-11-06 PROBLEM — Z79.4 TYPE 2 DIABETES MELLITUS WITH HYPERGLYCEMIA, WITH LONG-TERM CURRENT USE OF INSULIN (HCC): Status: ACTIVE | Noted: 2018-11-06

## 2018-11-06 PROBLEM — E11.65 TYPE 2 DIABETES MELLITUS WITH HYPERGLYCEMIA, WITH LONG-TERM CURRENT USE OF INSULIN (HCC): Status: ACTIVE | Noted: 2018-11-06

## 2018-11-29 PROBLEM — R42 DIZZINESS: Status: RESOLVED | Noted: 2018-03-26 | Resolved: 2018-11-29

## 2018-12-11 ENCOUNTER — APPOINTMENT (OUTPATIENT)
Dept: GENERAL RADIOLOGY | Age: 38
End: 2018-12-11
Payer: MEDICARE

## 2018-12-11 ENCOUNTER — HOSPITAL ENCOUNTER (EMERGENCY)
Age: 38
Discharge: HOME | End: 2018-12-11
Payer: MEDICARE

## 2018-12-11 VITALS
BODY MASS INDEX: 28.63 KG/M2 | DIASTOLIC BLOOD PRESSURE: 94 MMHG | TEMPERATURE: 98.3 F | HEART RATE: 88 BPM | WEIGHT: 200 LBS | HEIGHT: 70 IN | RESPIRATION RATE: 14 BRPM | SYSTOLIC BLOOD PRESSURE: 148 MMHG | OXYGEN SATURATION: 98 %

## 2018-12-11 DIAGNOSIS — S69.91XA FINGER INJURY, RIGHT, INITIAL ENCOUNTER: Primary | ICD-10-CM

## 2018-12-11 PROCEDURE — 99283 EMERGENCY DEPT VISIT LOW MDM: CPT

## 2018-12-11 PROCEDURE — 6370000000 HC RX 637 (ALT 250 FOR IP): Performed by: PHYSICIAN ASSISTANT

## 2018-12-11 PROCEDURE — 73130 X-RAY EXAM OF HAND: CPT

## 2018-12-11 RX ORDER — CEPHALEXIN 500 MG/1
500 CAPSULE ORAL ONCE
Status: COMPLETED | OUTPATIENT
Start: 2018-12-11 | End: 2018-12-11

## 2018-12-11 RX ORDER — SULFAMETHOXAZOLE AND TRIMETHOPRIM 800; 160 MG/1; MG/1
2 TABLET ORAL 2 TIMES DAILY
Qty: 40 TABLET | Refills: 0 | Status: SHIPPED | OUTPATIENT
Start: 2018-12-11 | End: 2018-12-21

## 2018-12-11 RX ORDER — CEPHALEXIN 500 MG/1
500 CAPSULE ORAL 3 TIMES DAILY
Qty: 30 CAPSULE | Refills: 0 | Status: SHIPPED | OUTPATIENT
Start: 2018-12-11 | End: 2018-12-21

## 2018-12-11 RX ORDER — SULFAMETHOXAZOLE AND TRIMETHOPRIM 800; 160 MG/1; MG/1
2 TABLET ORAL ONCE
Status: COMPLETED | OUTPATIENT
Start: 2018-12-11 | End: 2018-12-11

## 2018-12-11 RX ADMIN — CEPHALEXIN 500 MG: 500 CAPSULE ORAL at 22:13

## 2018-12-11 RX ADMIN — SULFAMETHOXAZOLE AND TRIMETHOPRIM 2 TABLET: 800; 160 TABLET ORAL at 22:13

## 2018-12-12 NOTE — ED PROVIDER NOTES
[facility]      HPI:  12/11/18,   Time: 9:12 PM         Marni Winters is a 43 y.o. male presenting to the ED for right index finger bruising and pain, beginning 2 days ago. The complaint has been persistent, mild in severity, and worsened by nothing. Pt states he was working on car engine today and afterward noted swelling and discoloration proximally over right index finger. States he can't remember any specific trauma or incident. States he is worried because the area is tender and looks swollen. He fells well otherwise. No fever, chills or vomiting. Has not seen any drainage. Moving finger well. ROS:     Constitutional: Negative for fever and chills  HENT: Negative for ear pain, sore throat and sinus pressure  Eyes: Negative for pain, discharge and redness  Respiratory:  Negative for shortness of breath, cough and wheezing  Cardiovascular: Negative for CP, edema or palpitations  Gastrointestinal: Negative for nausea, vomiting, diarrhea and abdominal distention  Genitourinary: Negative for dysuria and frequency  Musculoskeletal: See HPI  Skin: See HPI  Neurological: Negative for weakness and headaches  Hematological: Negative for adenopathy    All other systems reviewed and are negative      -------------------------------- PAST HISTORY ----------------------------------  Past Medical History:  has a past medical history of Asthma; Diabetes mellitus ([facility] [address] and Hypertension. Past Surgical History:  has no past surgical history on file. Social History:  reports that he quit smoking about 2 years ago. He has a 4.00 pack-year smoking history. He has never used smokeless tobacco. He reports that he does not drink alcohol or use drugs. Family History: family history includes Heart Attack in his father; Lupus in his mother. The patients home medications have been reviewed.     Allergies: Milk-related compounds    --------------------------------- RESULTS

## 2018-12-19 DIAGNOSIS — E11.65 TYPE 2 DIABETES MELLITUS WITH HYPERGLYCEMIA, WITH LONG-TERM CURRENT USE OF INSULIN (HCC): ICD-10-CM

## 2018-12-19 DIAGNOSIS — Z79.4 TYPE 2 DIABETES MELLITUS WITH HYPERGLYCEMIA, WITH LONG-TERM CURRENT USE OF INSULIN (HCC): ICD-10-CM

## 2018-12-19 DIAGNOSIS — R05.9 COUGH: ICD-10-CM

## 2018-12-19 DIAGNOSIS — J45.20 MILD INTERMITTENT ASTHMA WITHOUT COMPLICATION: ICD-10-CM

## 2018-12-19 RX ORDER — GLUCOSAMINE HCL/CHONDROITIN SU 500-400 MG
CAPSULE ORAL
Qty: 100 STRIP | Refills: 2 | Status: SHIPPED | OUTPATIENT
Start: 2018-12-19 | End: 2019-08-14

## 2018-12-19 RX ORDER — FLUTICASONE PROPIONATE 50 MCG
1 SPRAY, SUSPENSION (ML) NASAL DAILY
Qty: 1 BOTTLE | Refills: 5 | Status: SHIPPED | OUTPATIENT
Start: 2018-12-19

## 2018-12-19 RX ORDER — OMEPRAZOLE 20 MG/1
20 CAPSULE, DELAYED RELEASE ORAL EVERY 12 HOURS
Qty: 28 CAPSULE | Refills: 0 | Status: SHIPPED | OUTPATIENT
Start: 2018-12-19 | End: 2019-01-02

## 2018-12-19 RX ORDER — ALBUTEROL SULFATE 90 UG/1
2 AEROSOL, METERED RESPIRATORY (INHALATION) EVERY 6 HOURS PRN
Qty: 1 INHALER | Refills: 3 | Status: SHIPPED | OUTPATIENT
Start: 2018-12-19

## 2018-12-19 RX ORDER — INSULIN GLARGINE 100 [IU]/ML
20 INJECTION, SOLUTION SUBCUTANEOUS NIGHTLY
Qty: 5 VIAL | Refills: 5 | Status: SHIPPED | OUTPATIENT
Start: 2018-12-19 | End: 2019-01-14 | Stop reason: SDUPTHER

## 2019-01-14 ENCOUNTER — OFFICE VISIT (OUTPATIENT)
Dept: INTERNAL MEDICINE | Age: 39
End: 2019-01-14
Payer: MEDICARE

## 2019-01-14 ENCOUNTER — HOSPITAL ENCOUNTER (OUTPATIENT)
Age: 39
Discharge: HOME | End: 2019-01-14
Payer: MEDICARE

## 2019-01-14 VITALS
TEMPERATURE: 98.2 F | RESPIRATION RATE: 20 BRPM | HEART RATE: 107 BPM | SYSTOLIC BLOOD PRESSURE: 134 MMHG | WEIGHT: 206 LBS | HEIGHT: 70 IN | DIASTOLIC BLOOD PRESSURE: 85 MMHG | BODY MASS INDEX: 29.49 KG/M2

## 2019-01-14 DIAGNOSIS — E11.65 TYPE 2 DIABETES MELLITUS WITH HYPERGLYCEMIA, WITH LONG-TERM CURRENT USE OF INSULIN (HCC): ICD-10-CM

## 2019-01-14 DIAGNOSIS — Z79.4 TYPE 2 DIABETES MELLITUS WITH HYPERGLYCEMIA, WITH LONG-TERM CURRENT USE OF INSULIN (HCC): ICD-10-CM

## 2019-01-14 DIAGNOSIS — L65.9 ALOPECIA: ICD-10-CM

## 2019-01-14 DIAGNOSIS — E11.65 TYPE 2 DIABETES MELLITUS WITH HYPERGLYCEMIA, WITH LONG-TERM CURRENT USE OF INSULIN (HCC): Primary | ICD-10-CM

## 2019-01-14 DIAGNOSIS — Z23 NEED FOR INFLUENZA VACCINATION: ICD-10-CM

## 2019-01-14 DIAGNOSIS — Z79.4 TYPE 2 DIABETES MELLITUS WITH HYPERGLYCEMIA, WITH LONG-TERM CURRENT USE OF INSULIN (HCC): Primary | ICD-10-CM

## 2019-01-14 LAB
CREATININE URINE: 50 MG/DL (ref 40–278)
HBA1C MFR BLD: 11.5 %
MICROALBUMIN UR-MCNC: 35.4 MG/L
MICROALBUMIN/CREAT UR-RTO: 70.8 (ref 0–30)
TSH SERPL DL<=0.05 MIU/L-ACNC: 0.75 UIU/ML (ref 0.27–4.2)

## 2019-01-14 PROCEDURE — 90686 IIV4 VACC NO PRSV 0.5 ML IM: CPT

## 2019-01-14 PROCEDURE — G8417 CALC BMI ABV UP PARAM F/U: HCPCS | Performed by: INTERNAL MEDICINE

## 2019-01-14 PROCEDURE — 1036F TOBACCO NON-USER: CPT | Performed by: INTERNAL MEDICINE

## 2019-01-14 PROCEDURE — G0008 ADMIN INFLUENZA VIRUS VAC: HCPCS

## 2019-01-14 PROCEDURE — 36415 COLL VENOUS BLD VENIPUNCTURE: CPT

## 2019-01-14 PROCEDURE — G8427 DOCREV CUR MEDS BY ELIG CLIN: HCPCS | Performed by: INTERNAL MEDICINE

## 2019-01-14 PROCEDURE — 3046F HEMOGLOBIN A1C LEVEL >9.0%: CPT | Performed by: INTERNAL MEDICINE

## 2019-01-14 PROCEDURE — 82570 ASSAY OF URINE CREATININE: CPT

## 2019-01-14 PROCEDURE — 84443 ASSAY THYROID STIM HORMONE: CPT

## 2019-01-14 PROCEDURE — 2022F DILAT RTA XM EVC RTNOPTHY: CPT | Performed by: INTERNAL MEDICINE

## 2019-01-14 PROCEDURE — 6360000002 HC RX W HCPCS

## 2019-01-14 PROCEDURE — 83036 HEMOGLOBIN GLYCOSYLATED A1C: CPT | Performed by: INTERNAL MEDICINE

## 2019-01-14 PROCEDURE — G8482 FLU IMMUNIZE ORDER/ADMIN: HCPCS | Performed by: INTERNAL MEDICINE

## 2019-01-14 PROCEDURE — 99213 OFFICE O/P EST LOW 20 MIN: CPT | Performed by: INTERNAL MEDICINE

## 2019-01-14 PROCEDURE — 82044 UR ALBUMIN SEMIQUANTITATIVE: CPT

## 2019-01-14 RX ORDER — INSULIN GLARGINE 100 [IU]/ML
25 INJECTION, SOLUTION SUBCUTANEOUS NIGHTLY
Qty: 5 VIAL | Refills: 5 | Status: SHIPPED | OUTPATIENT
Start: 2019-01-14 | End: 2019-08-14 | Stop reason: SDUPTHER

## 2019-01-22 ENCOUNTER — OFFICE VISIT (OUTPATIENT)
Dept: INTERNAL MEDICINE | Age: 39
End: 2019-01-22
Payer: MEDICARE

## 2019-01-22 VITALS
BODY MASS INDEX: 29.92 KG/M2 | DIASTOLIC BLOOD PRESSURE: 80 MMHG | HEIGHT: 70 IN | TEMPERATURE: 97.8 F | HEART RATE: 100 BPM | RESPIRATION RATE: 16 BRPM | WEIGHT: 209 LBS | SYSTOLIC BLOOD PRESSURE: 130 MMHG

## 2019-01-22 DIAGNOSIS — E11.65 TYPE 2 DIABETES MELLITUS WITH HYPERGLYCEMIA, WITH LONG-TERM CURRENT USE OF INSULIN (HCC): ICD-10-CM

## 2019-01-22 DIAGNOSIS — Z79.4 TYPE 2 DIABETES MELLITUS WITH HYPERGLYCEMIA, WITH LONG-TERM CURRENT USE OF INSULIN (HCC): ICD-10-CM

## 2019-01-22 PROCEDURE — 99212 OFFICE O/P EST SF 10 MIN: CPT | Performed by: INTERNAL MEDICINE

## 2019-01-22 PROCEDURE — 3046F HEMOGLOBIN A1C LEVEL >9.0%: CPT | Performed by: INTERNAL MEDICINE

## 2019-01-22 PROCEDURE — G8427 DOCREV CUR MEDS BY ELIG CLIN: HCPCS | Performed by: INTERNAL MEDICINE

## 2019-01-22 PROCEDURE — G8482 FLU IMMUNIZE ORDER/ADMIN: HCPCS | Performed by: INTERNAL MEDICINE

## 2019-01-22 PROCEDURE — 1036F TOBACCO NON-USER: CPT | Performed by: INTERNAL MEDICINE

## 2019-01-22 PROCEDURE — 99213 OFFICE O/P EST LOW 20 MIN: CPT | Performed by: INTERNAL MEDICINE

## 2019-01-22 PROCEDURE — G8417 CALC BMI ABV UP PARAM F/U: HCPCS | Performed by: INTERNAL MEDICINE

## 2019-01-22 PROCEDURE — 2022F DILAT RTA XM EVC RTNOPTHY: CPT | Performed by: INTERNAL MEDICINE

## 2019-01-22 RX ORDER — DULOXETIN HYDROCHLORIDE 60 MG/1
CAPSULE, DELAYED RELEASE ORAL
Refills: 1 | COMMUNITY
Start: 2018-12-26

## 2019-01-22 RX ORDER — BLOOD-GLUCOSE METER
KIT MISCELLANEOUS
Qty: 1 KIT | Refills: 0 | Status: SHIPPED | OUTPATIENT
Start: 2019-01-22 | End: 2019-08-14

## 2019-04-23 ENCOUNTER — TELEPHONE (OUTPATIENT)
Dept: INTERNAL MEDICINE | Age: 39
End: 2019-04-23

## 2019-04-23 NOTE — TELEPHONE ENCOUNTER
Reached out to patient due to being on the non-adherent list and to schedule an appt. Patient was seen in Jan 2019 and missed his last appt. Unable to reached patient, left a voicemail.

## 2019-08-08 ENCOUNTER — HOSPITAL ENCOUNTER (EMERGENCY)
Age: 39
Discharge: HOME | End: 2019-08-08
Attending: FAMILY MEDICINE
Payer: MEDICARE

## 2019-08-08 ENCOUNTER — CARE COORDINATION (OUTPATIENT)
Dept: CARE COORDINATION | Age: 39
End: 2019-08-08

## 2019-08-08 ENCOUNTER — APPOINTMENT (OUTPATIENT)
Dept: CT IMAGING | Age: 39
End: 2019-08-08
Payer: MEDICARE

## 2019-08-08 VITALS
RESPIRATION RATE: 16 BRPM | HEART RATE: 78 BPM | WEIGHT: 215 LBS | OXYGEN SATURATION: 99 % | DIASTOLIC BLOOD PRESSURE: 90 MMHG | BODY MASS INDEX: 30.1 KG/M2 | TEMPERATURE: 97.9 F | SYSTOLIC BLOOD PRESSURE: 150 MMHG | HEIGHT: 71 IN

## 2019-08-08 DIAGNOSIS — K59.00 CONSTIPATION, UNSPECIFIED CONSTIPATION TYPE: ICD-10-CM

## 2019-08-08 DIAGNOSIS — E08.65 DIABETES MELLITUS DUE TO UNDERLYING CONDITION, UNCONTROLLED, WITH HYPERGLYCEMIA (HCC): Primary | ICD-10-CM

## 2019-08-08 LAB
ALBUMIN SERPL-MCNC: 4.2 G/DL (ref 3.5–5.2)
ALP BLD-CCNC: 128 U/L (ref 40–129)
ALT SERPL-CCNC: 12 U/L (ref 0–40)
ANION GAP SERPL CALCULATED.3IONS-SCNC: 12 MMOL/L (ref 7–16)
AST SERPL-CCNC: 12 U/L (ref 0–39)
BASOPHILS ABSOLUTE: 0.02 E9/L (ref 0–0.2)
BASOPHILS RELATIVE PERCENT: 0.3 % (ref 0–2)
BILIRUB SERPL-MCNC: 0.5 MG/DL (ref 0–1.2)
BILIRUBIN URINE: NEGATIVE
BLOOD, URINE: NEGATIVE
BUN BLDV-MCNC: 16 MG/DL (ref 6–20)
CALCIUM SERPL-MCNC: 9.4 MG/DL (ref 8.6–10.2)
CHLORIDE BLD-SCNC: 96 MMOL/L (ref 98–107)
CLARITY: CLEAR
CO2: 27 MMOL/L (ref 22–29)
COLOR: YELLOW
CREAT SERPL-MCNC: 0.9 MG/DL (ref 0.7–1.2)
EOSINOPHILS ABSOLUTE: 0.13 E9/L (ref 0.05–0.5)
EOSINOPHILS RELATIVE PERCENT: 2.2 % (ref 0–6)
GFR AFRICAN AMERICAN: >60
GFR NON-AFRICAN AMERICAN: >60 ML/MIN/1.73
GLUCOSE BLD-MCNC: 436 MG/DL (ref 74–99)
GLUCOSE URINE: >=1000 MG/DL
HCT VFR BLD CALC: 41.5 % (ref 37–54)
HEMOGLOBIN: 14.2 G/DL (ref 12.5–16.5)
IMMATURE GRANULOCYTES #: 0.02 E9/L
IMMATURE GRANULOCYTES %: 0.3 % (ref 0–5)
KETONES, URINE: 15 MG/DL
LACTIC ACID: 1.3 MMOL/L (ref 0.5–2.2)
LEUKOCYTE ESTERASE, URINE: NEGATIVE
LIPASE: 56 U/L (ref 13–60)
LYMPHOCYTES ABSOLUTE: 1.27 E9/L (ref 1.5–4)
LYMPHOCYTES RELATIVE PERCENT: 21.7 % (ref 20–42)
MCH RBC QN AUTO: 28 PG (ref 26–35)
MCHC RBC AUTO-ENTMCNC: 34.2 % (ref 32–34.5)
MCV RBC AUTO: 81.7 FL (ref 80–99.9)
METER GLUCOSE: 233 MG/DL (ref 74–99)
MONOCYTES ABSOLUTE: 0.35 E9/L (ref 0.1–0.95)
MONOCYTES RELATIVE PERCENT: 6 % (ref 2–12)
NEUTROPHILS ABSOLUTE: 4.05 E9/L (ref 1.8–7.3)
NEUTROPHILS RELATIVE PERCENT: 69.5 % (ref 43–80)
NITRITE, URINE: NEGATIVE
PDW BLD-RTO: 12 FL (ref 11.5–15)
PH UA: 6 (ref 5–9)
PLATELET # BLD: 292 E9/L (ref 130–450)
PMV BLD AUTO: 10.1 FL (ref 7–12)
POTASSIUM REFLEX MAGNESIUM: 4.4 MMOL/L (ref 3.5–5)
PROTEIN UA: NEGATIVE MG/DL
RBC # BLD: 5.08 E12/L (ref 3.8–5.8)
SODIUM BLD-SCNC: 135 MMOL/L (ref 132–146)
SPECIFIC GRAVITY UA: 1.01 (ref 1–1.03)
TOTAL PROTEIN: 6.8 G/DL (ref 6.4–8.3)
UROBILINOGEN, URINE: 0.2 E.U./DL
WBC # BLD: 5.8 E9/L (ref 4.5–11.5)

## 2019-08-08 PROCEDURE — 36415 COLL VENOUS BLD VENIPUNCTURE: CPT

## 2019-08-08 PROCEDURE — 87186 SC STD MICRODIL/AGAR DIL: CPT

## 2019-08-08 PROCEDURE — 6370000000 HC RX 637 (ALT 250 FOR IP): Performed by: FAMILY MEDICINE

## 2019-08-08 PROCEDURE — 85025 COMPLETE CBC W/AUTO DIFF WBC: CPT

## 2019-08-08 PROCEDURE — 81003 URINALYSIS AUTO W/O SCOPE: CPT

## 2019-08-08 PROCEDURE — 83605 ASSAY OF LACTIC ACID: CPT

## 2019-08-08 PROCEDURE — 74176 CT ABD & PELVIS W/O CONTRAST: CPT

## 2019-08-08 PROCEDURE — 96372 THER/PROPH/DIAG INJ SC/IM: CPT

## 2019-08-08 PROCEDURE — 87088 URINE BACTERIA CULTURE: CPT

## 2019-08-08 PROCEDURE — 80053 COMPREHEN METABOLIC PANEL: CPT

## 2019-08-08 PROCEDURE — 99283 EMERGENCY DEPT VISIT LOW MDM: CPT

## 2019-08-08 PROCEDURE — 82962 GLUCOSE BLOOD TEST: CPT

## 2019-08-08 PROCEDURE — 83690 ASSAY OF LIPASE: CPT

## 2019-08-08 PROCEDURE — 2580000003 HC RX 258: Performed by: FAMILY MEDICINE

## 2019-08-08 RX ORDER — MAGNESIUM CARB/ALUMINUM HYDROX 105-160MG
150 TABLET,CHEWABLE ORAL DAILY
Qty: 300 ML | Refills: 0 | Status: SHIPPED | OUTPATIENT
Start: 2019-08-08 | End: 2019-08-10

## 2019-08-08 RX ORDER — POLYETHYLENE GLYCOL 3350 17 G/17G
17 POWDER, FOR SOLUTION ORAL DAILY
Qty: 510 G | Refills: 0 | Status: SHIPPED | OUTPATIENT
Start: 2019-08-08 | End: 2019-09-07

## 2019-08-08 RX ORDER — 0.9 % SODIUM CHLORIDE 0.9 %
1000 INTRAVENOUS SOLUTION INTRAVENOUS ONCE
Status: COMPLETED | OUTPATIENT
Start: 2019-08-08 | End: 2019-08-08

## 2019-08-08 RX ORDER — 0.9 % SODIUM CHLORIDE 0.9 %
2000 INTRAVENOUS SOLUTION INTRAVENOUS ONCE
Status: COMPLETED | OUTPATIENT
Start: 2019-08-08 | End: 2019-08-08

## 2019-08-08 RX ADMIN — MAGNESIUM CITRATE 296 ML: 1.75 LIQUID ORAL at 17:22

## 2019-08-08 RX ADMIN — INSULIN HUMAN 23 UNITS: 100 INJECTION, SOLUTION PARENTERAL at 17:59

## 2019-08-08 RX ADMIN — SODIUM CHLORIDE 2000 ML: 9 INJECTION, SOLUTION INTRAVENOUS at 16:24

## 2019-08-08 RX ADMIN — SODIUM CHLORIDE 1000 ML: 9 INJECTION, SOLUTION INTRAVENOUS at 17:44

## 2019-08-08 ASSESSMENT — PAIN DESCRIPTION - PAIN TYPE: TYPE: ACUTE PAIN

## 2019-08-08 ASSESSMENT — PAIN DESCRIPTION - FREQUENCY: FREQUENCY: CONTINUOUS

## 2019-08-08 ASSESSMENT — PAIN SCALES - GENERAL: PAINLEVEL_OUTOF10: 10

## 2019-08-08 ASSESSMENT — PAIN - FUNCTIONAL ASSESSMENT: PAIN_FUNCTIONAL_ASSESSMENT: ACTIVITIES ARE NOT PREVENTED

## 2019-08-08 ASSESSMENT — PAIN DESCRIPTION - DESCRIPTORS: DESCRIPTORS: DISCOMFORT

## 2019-08-08 ASSESSMENT — PAIN DESCRIPTION - PROGRESSION: CLINICAL_PROGRESSION: GRADUALLY WORSENING

## 2019-08-08 ASSESSMENT — PAIN DESCRIPTION - LOCATION: LOCATION: ABDOMEN

## 2019-08-08 ASSESSMENT — PAIN DESCRIPTION - ORIENTATION: ORIENTATION: RIGHT;LEFT

## 2019-08-08 NOTE — ED PROVIDER NOTES
5.0 mmol/L    Chloride 96 (L) 98 - 107 mmol/L    CO2 27 22 - 29 mmol/L    Anion Gap 12 7 - 16 mmol/L    Glucose 436 (H) 74 - 99 mg/dL    BUN 16 6 - 20 mg/dL    CREATININE 0.9 0.7 - 1.2 mg/dL    GFR Non-African American >60 >=60 mL/min/1.73    GFR African American >60     Calcium 9.4 8.6 - 10.2 mg/dL    Total Protein 6.8 6.4 - 8.3 g/dL    Alb 4.2 3.5 - 5.2 g/dL    Total Bilirubin 0.5 0.0 - 1.2 mg/dL    Alkaline Phosphatase 128 40 - 129 U/L    ALT 12 0 - 40 U/L    AST 12 0 - 39 U/L   Lipase   Result Value Ref Range    Lipase 56 13 - 60 U/L   Urinalysis, reflex to microscopic   Result Value Ref Range    Color, UA Yellow Straw/Yellow    Clarity, UA Clear Clear    Glucose, Ur >=1000 (A) Negative mg/dL    Bilirubin Urine Negative Negative    Ketones, Urine 15 (A) Negative mg/dL    Specific Gravity, UA 1.010 1.005 - 1.030    Blood, Urine Negative Negative    pH, UA 6.0 5.0 - 9.0    Protein, UA Negative Negative mg/dL    Urobilinogen, Urine 0.2 <2.0 E.U./dL    Nitrite, Urine Negative Negative    Leukocyte Esterase, Urine Negative Negative   Lactic Acid, Plasma   Result Value Ref Range    Lactic Acid 1.3 0.5 - 2.2 mmol/L     Imaging: All Radiology results interpreted by Radiologist unless otherwise noted. CT ABDOMEN PELVIS WO CONTRAST   Final Result      There is no evidence to suggest acute process on CT of abdomen and   pelvis. Small sliding hiatal hernia. Normal appearance of the appendix. Excessive stool throughout the colon. Diverticulosis coli without evidence of diverticulitis. Calcifications within the seminal vesicles and prostate gland.                 ED Course / Medical Decision Making     Medications   0.9 % sodium chloride bolus (2,000 mLs Intravenous New Bag 8/8/19 5628)   magnesium citrate solution 296 mL (has no administration in time range)   insulin regular (HUMULIN R;NOVOLIN R) injection 23 Units (has no administration in time range)   0.9 % sodium chloride bolus (has no

## 2019-08-09 ENCOUNTER — HOSPITAL ENCOUNTER (EMERGENCY)
Age: 39
Discharge: HOME | End: 2019-08-09
Attending: EMERGENCY MEDICINE
Payer: MEDICARE

## 2019-08-09 VITALS
SYSTOLIC BLOOD PRESSURE: 124 MMHG | OXYGEN SATURATION: 98 % | HEART RATE: 90 BPM | DIASTOLIC BLOOD PRESSURE: 72 MMHG | TEMPERATURE: 98.8 F | RESPIRATION RATE: 16 BRPM

## 2019-08-09 DIAGNOSIS — L25.9 CONTACT DERMATITIS, UNSPECIFIED CONTACT DERMATITIS TYPE, UNSPECIFIED TRIGGER: Primary | ICD-10-CM

## 2019-08-09 PROCEDURE — 99282 EMERGENCY DEPT VISIT SF MDM: CPT

## 2019-08-09 RX ORDER — DIPHENHYDRAMINE HCL 25 MG
TABLET ORAL
Status: DISCONTINUED
Start: 2019-08-09 | End: 2019-08-10 | Stop reason: HOSPADM

## 2019-08-09 RX ORDER — HYDROXYZINE PAMOATE 50 MG/1
50 CAPSULE ORAL 3 TIMES DAILY PRN
Qty: 21 CAPSULE | Refills: 0 | Status: SHIPPED | OUTPATIENT
Start: 2019-08-09 | End: 2019-08-14

## 2019-08-09 RX ORDER — HYDROXYZINE PAMOATE 25 MG/1
25 CAPSULE ORAL ONCE
Status: DISCONTINUED | OUTPATIENT
Start: 2019-08-09 | End: 2019-08-10 | Stop reason: HOSPADM

## 2019-08-09 RX ORDER — FAMOTIDINE 20 MG/1
20 TABLET, FILM COATED ORAL 2 TIMES DAILY
Qty: 14 TABLET | Refills: 0 | Status: SHIPPED | OUTPATIENT
Start: 2019-08-09 | End: 2019-08-14 | Stop reason: ALTCHOICE

## 2019-08-09 RX ORDER — MUPIROCIN CALCIUM 20 MG/G
CREAM TOPICAL
Qty: 15 G | Refills: 0 | Status: SHIPPED | OUTPATIENT
Start: 2019-08-09 | End: 2019-09-08

## 2019-08-09 ASSESSMENT — PAIN DESCRIPTION - PAIN TYPE: TYPE: ACUTE PAIN

## 2019-08-09 ASSESSMENT — PAIN DESCRIPTION - DESCRIPTORS: DESCRIPTORS: DISCOMFORT

## 2019-08-09 ASSESSMENT — PAIN SCALES - GENERAL: PAINLEVEL_OUTOF10: 6

## 2019-08-10 LAB
ORGANISM: ABNORMAL
URINE CULTURE, ROUTINE: ABNORMAL
URINE CULTURE, ROUTINE: ABNORMAL

## 2019-08-10 NOTE — ED PROVIDER NOTES
answered at this time and they are agreeable with the plan.      --------------------------------- IMPRESSION AND DISPOSITION ---------------------------------    IMPRESSION  1.  Contact dermatitis, unspecified contact dermatitis type, unspecified trigger        DISPOSITION  Disposition: Discharge to home  Patient condition is good                  Lon Frnacois MD  08/09/19 2220

## 2019-08-14 ENCOUNTER — OFFICE VISIT (OUTPATIENT)
Dept: INTERNAL MEDICINE | Age: 39
End: 2019-08-14
Payer: MEDICARE

## 2019-08-14 ENCOUNTER — CARE COORDINATION (OUTPATIENT)
Dept: CARE COORDINATION | Age: 39
End: 2019-08-14

## 2019-08-14 VITALS
TEMPERATURE: 98.6 F | WEIGHT: 199.5 LBS | HEART RATE: 102 BPM | BODY MASS INDEX: 27.93 KG/M2 | SYSTOLIC BLOOD PRESSURE: 101 MMHG | DIASTOLIC BLOOD PRESSURE: 70 MMHG | HEIGHT: 71 IN | RESPIRATION RATE: 16 BRPM

## 2019-08-14 DIAGNOSIS — Z78.9 TETANUS TOXOID VACCINATION STATUS UNKNOWN: ICD-10-CM

## 2019-08-14 DIAGNOSIS — Z79.4 TYPE 2 DIABETES MELLITUS WITH HYPERGLYCEMIA, WITH LONG-TERM CURRENT USE OF INSULIN (HCC): Primary | ICD-10-CM

## 2019-08-14 DIAGNOSIS — J40 BRONCHITIS: ICD-10-CM

## 2019-08-14 DIAGNOSIS — R21 RASH: ICD-10-CM

## 2019-08-14 DIAGNOSIS — E11.65 TYPE 2 DIABETES MELLITUS WITH HYPERGLYCEMIA, WITH LONG-TERM CURRENT USE OF INSULIN (HCC): Primary | ICD-10-CM

## 2019-08-14 PROCEDURE — 99212 OFFICE O/P EST SF 10 MIN: CPT | Performed by: INTERNAL MEDICINE

## 2019-08-14 PROCEDURE — 99213 OFFICE O/P EST LOW 20 MIN: CPT | Performed by: INTERNAL MEDICINE

## 2019-08-14 RX ORDER — BLOOD-GLUCOSE METER
KIT MISCELLANEOUS
Qty: 1 KIT | Refills: 0 | Status: SHIPPED | OUTPATIENT
Start: 2019-08-14

## 2019-08-14 RX ORDER — GUAIFENESIN 600 MG/1
600 TABLET, EXTENDED RELEASE ORAL 2 TIMES DAILY
Qty: 30 TABLET | Refills: 0 | Status: SHIPPED | OUTPATIENT
Start: 2019-08-14 | End: 2019-08-27

## 2019-08-14 RX ORDER — INSULIN GLARGINE 100 [IU]/ML
25 INJECTION, SOLUTION SUBCUTANEOUS NIGHTLY
Qty: 5 VIAL | Refills: 5 | Status: SHIPPED | OUTPATIENT
Start: 2019-08-14

## 2019-08-14 RX ORDER — GLUCOSAMINE HCL/CHONDROITIN SU 500-400 MG
CAPSULE ORAL
Qty: 100 STRIP | Refills: 2 | Status: SHIPPED | OUTPATIENT
Start: 2019-08-14 | End: 2019-08-14

## 2019-08-14 RX ORDER — ATORVASTATIN CALCIUM 40 MG/1
40 TABLET, FILM COATED ORAL DAILY
Qty: 30 TABLET | Refills: 3 | Status: SHIPPED | OUTPATIENT
Start: 2019-08-14

## 2019-08-14 RX ORDER — VIT E ACET/GLY/DIMETH/WATER
LOTION (ML) TOPICAL
Qty: 12 OZ | Refills: 1 | Status: SHIPPED | OUTPATIENT
Start: 2019-08-14

## 2019-08-14 RX ORDER — BLOOD-GLUCOSE METER
KIT MISCELLANEOUS
Qty: 1 KIT | Refills: 0 | Status: SHIPPED | OUTPATIENT
Start: 2019-08-14 | End: 2019-08-14

## 2019-08-14 RX ORDER — GLUCOSAMINE HCL/CHONDROITIN SU 500-400 MG
CAPSULE ORAL
Qty: 100 STRIP | Refills: 2 | Status: SHIPPED | OUTPATIENT
Start: 2019-08-14

## 2019-08-14 RX ORDER — TRIAMCINOLONE ACETONIDE 5 MG/G
CREAM TOPICAL
Qty: 1 TUBE | Refills: 1 | Status: CANCELLED | OUTPATIENT
Start: 2019-08-14

## 2019-08-14 ASSESSMENT — ENCOUNTER SYMPTOMS
STRIDOR: 0
BLOOD IN STOOL: 0
SHORTNESS OF BREATH: 0
WHEEZING: 0
SINUS PAIN: 0
CONSTIPATION: 1
VOMITING: 0
ABDOMINAL PAIN: 0
NAUSEA: 0
DIARRHEA: 0
COUGH: 1
RHINORRHEA: 0

## 2019-08-14 ASSESSMENT — PATIENT HEALTH QUESTIONNAIRE - PHQ9
SUM OF ALL RESPONSES TO PHQ QUESTIONS 1-9: 0
1. LITTLE INTEREST OR PLEASURE IN DOING THINGS: 0
SUM OF ALL RESPONSES TO PHQ QUESTIONS 1-9: 0
SUM OF ALL RESPONSES TO PHQ9 QUESTIONS 1 & 2: 0
2. FEELING DOWN, DEPRESSED OR HOPELESS: 0

## 2019-08-14 NOTE — PROGRESS NOTES
Adult)   Pulse 102   Resp 16   Ht 5' 11\" (1.803 m)   Wt 199 lb 8 oz (90.5 kg)   BMI 27.82 kg/m²   Physical Exam   Constitutional: He is oriented to person, place, and time. He appears well-developed and well-nourished. No distress. HENT:   Head: Normocephalic and atraumatic. Eyes: Pupils are equal, round, and reactive to light. Conjunctivae are normal. No scleral icterus. Cardiovascular: Normal rate, regular rhythm, S1 normal, S2 normal, normal heart sounds and intact distal pulses. No murmur heard. Pulmonary/Chest: Effort normal and breath sounds normal. No respiratory distress. He has no wheezes. He has no rales. Abdominal: Soft. Bowel sounds are normal. He exhibits no distension and no mass. There is no tenderness. There is no guarding. Musculoskeletal: He exhibits no edema or tenderness. Neurological: He is alert and oriented to person, place, and time. No cranial nerve deficit. Skin: Skin is warm and dry. Rash noted. He is not diaphoretic.    On back of right neck  Rash on buttocks       ASSESSMENT/PLAN:    T2DM  - doesn't have a meter and doesn't check his readings  - has symptoms of hypoglycemia especially at night time--termors, palpitations, diaphoresis  - on Lantus 20-30 units nightly and humalog 15 units premeals tid.   - A1C 11.5 1/14/19  - dietary and medication non-compliance, eats fast foods and drinks a lot of pop  - advised to keep a blood glucose log for the next 2 weeks, readings 3 times a day  - microalbuminura  - counseled to cut out pop and fast foods and stressed the need for compliance  - refer to Podiatrist  - saw Ophthalmologist, already having signs of retinopathy  - met with diabetic educator  - also met with  to try and facilitate getting meter and testing material    Bronchitis  - continue albuterol as needed   - add on mucinex    Mild intermittent asthma  - stable on albuterol rescue and flonase as needed     Rash  - itchy scaly  - order cetaphil cream

## 2019-08-14 NOTE — CARE COORDINATION
Billy Peralta MD   blood glucose monitor strips Check blood sugar once daily fasting. May substitute brand for insurance coverage 8/14/19   Lidia Ortiz MD   glucose monitoring kit (FREESTYLE) monitoring kit Check blood sugar once daily fasting. May substitute brand for insurance coverage 8/14/19   Lidia Ortiz MD   Lancets Misc. MISC Check blood glucose once daily fasting. May substitute brand for insurance coverage. 8/14/19   Lidia Ortiz MD   mupirocin (BACTROBAN) 2 % cream Apply topically 3 times daily. 8/9/19 9/8/19  Debby Mann MD   polyethylene glycol [facility]) powder Take 17 g by mouth daily 8/8/19 9/7/19  Renetta Salguero MD   polyethylene glycol [facility]) powder Take 17 g by mouth daily 8/8/19 9/7/19  Renetta Salguero MD   DULoxetine (CYMBALTA) 60 MG extended release capsule TK 2 CS PO QAM 12/26/18   Historical Provider, MD   fluticasone (FLONASE) 50 MCG/ACT nasal spray 1 spray by Nasal route daily 12/19/18   Lorraine Gan,    albuterol sulfate HFA (VENTOLIN HFA) 108 (90 Base) MCG/ACT inhaler Inhale 2 puffs into the lungs every 6 hours as needed for Wheezing 12/19/18   Lorraine Gan DO   terbinafine (LAMISIL) 1 % cream Apply topically 2 times daily. Patient not taking: Reported on 8/14/2019 8/16/18   Renetta Salguero MD   doxepin (SINEQUAN) 10 MG capsule TK 1 OR 2 CS PO QHS PRN 3/5/18   Historical Provider, MD   ibuprofen (ADVIL;MOTRIN) 800 MG tablet Take 1 tablet by mouth every 6 hours as needed for Pain 3/26/18 8/9/19  WONG Godwin - CNP       Future Appointments   Date Time Provider Demetrio Montiel   9/5/2019  1:00 PM Chon Harrington MD [facility]     ,   Diabetes Assessment    Medic Alert ID:  No  Meal Planning:  None   How often do you test your blood sugar?:  Meals, Bedtime   Do you have barriers with adherence to non-pharmacologic self-management interventions?  (Nutrition/Exercise/Self-Monitoring):  No   Have you ever had to go to the ED for symptoms of low blood sugar?:  No       Do you have hyperglycemia symptoms?:  Yes   Frequency of Episodes:  1 Per:  Day   Do you have hypoglycemia symptoms?:  Yes   Frequency of Episodes:  1 Per:  Week   Blood Sugar Monitoring Regimen:  Not Testing       and   General Assessment    Do you have any symptoms that are causing concern?:  Yes  Progression since Onset:  Unchanged  Reported Symptoms:  Rash

## 2019-08-21 ENCOUNTER — OFFICE VISIT (OUTPATIENT)
Dept: INTERNAL MEDICINE | Age: 39
End: 2019-08-21
Payer: MEDICARE

## 2019-08-21 ENCOUNTER — HOSPITAL ENCOUNTER (OUTPATIENT)
Dept: GENERAL RADIOLOGY | Age: 39
Discharge: HOME | End: 2019-08-23
Payer: MEDICARE

## 2019-08-21 ENCOUNTER — HOSPITAL ENCOUNTER (OUTPATIENT)
Age: 39
Discharge: HOME | End: 2019-08-23
Payer: MEDICARE

## 2019-08-21 VITALS
WEIGHT: 205 LBS | HEART RATE: 80 BPM | BODY MASS INDEX: 28.7 KG/M2 | SYSTOLIC BLOOD PRESSURE: 125 MMHG | DIASTOLIC BLOOD PRESSURE: 84 MMHG | TEMPERATURE: 97.6 F | RESPIRATION RATE: 16 BRPM | HEIGHT: 71 IN

## 2019-08-21 DIAGNOSIS — L30.9 DERMATITIS: ICD-10-CM

## 2019-08-21 DIAGNOSIS — R05.8 COUGH PRESENT FOR GREATER THAN 3 WEEKS: ICD-10-CM

## 2019-08-21 DIAGNOSIS — R05.8 COUGH PRESENT FOR GREATER THAN 3 WEEKS: Primary | ICD-10-CM

## 2019-08-21 DIAGNOSIS — S19.9XXA INJURY OF NECK, INITIAL ENCOUNTER: ICD-10-CM

## 2019-08-21 DIAGNOSIS — M54.2 ACUTE NECK PAIN: ICD-10-CM

## 2019-08-21 PROCEDURE — 99212 OFFICE O/P EST SF 10 MIN: CPT | Performed by: INTERNAL MEDICINE

## 2019-08-21 PROCEDURE — 72050 X-RAY EXAM NECK SPINE 4/5VWS: CPT

## 2019-08-21 PROCEDURE — 71046 X-RAY EXAM CHEST 2 VIEWS: CPT

## 2019-08-21 PROCEDURE — 99213 OFFICE O/P EST LOW 20 MIN: CPT | Performed by: INTERNAL MEDICINE

## 2019-08-21 RX ORDER — AMOXICILLIN AND CLAVULANATE POTASSIUM 875; 125 MG/1; MG/1
1 TABLET, FILM COATED ORAL 2 TIMES DAILY
Qty: 20 TABLET | Refills: 0 | Status: SHIPPED | OUTPATIENT
Start: 2019-08-21 | End: 2019-08-31

## 2019-08-21 RX ORDER — TRIAMCINOLONE ACETONIDE 0.25 MG/G
OINTMENT TOPICAL
Qty: 1 TUBE | Refills: 0 | Status: SHIPPED | OUTPATIENT
Start: 2019-08-21 | End: 2019-08-28

## 2019-08-21 ASSESSMENT — ENCOUNTER SYMPTOMS
SORE THROAT: 0
SINUS PAIN: 1
COUGH: 1
SINUS PRESSURE: 1
RHINORRHEA: 1
BACK PAIN: 0
SHORTNESS OF BREATH: 0
ABDOMINAL PAIN: 0
WHEEZING: 0

## 2019-08-21 NOTE — PROGRESS NOTES
Discharge instructions reviewed with patient per Dr. Jacqueline Prado. Patient directed to  to  AVS and schedule follow up appt.
[facility]  Internal[facility] Res[facility]  ACC Note      SUBJECTIVE:  CC: had concerns including Neck Pain and Rash (Rash on back of neck). HPI:Misha Rascon 35years old male with hx of diabetes on insulin, tobacco abuse presented to the [facility] as  Walk in. He complained of right neck pain with rash. 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 Meanwhile, he also had cough with thick mucus, sinus congestions which was also associated to neck pain. He denied any open surgery, never had seeked medical advice after neck injury. Last week he came to clinic due to cough and sputum, he was given mucinex, however he never got it because he didn't think that will help. Therefore, he consistently has cough with thick mucus and sinus pressure and congestions. Denied fever, chills, night sweat or chest pain. He noticed one lump over right submandibular area, which is tender. Review of Systems   Constitutional: Negative for chills, fever and unexpected weight change. HENT: Positive for congestion, ear pain, postnasal drip, rhinorrhea, sinus pressure and sinus pain. Negative for sore throat. Eyes: Negative for visual disturbance. Respiratory: Positive for cough. Negative for shortness of breath and wheezing. Cardiovascular: Negative for chest pain and leg swelling. Gastrointestinal: Negative for abdominal pain. Genitourinary: Negative for dysuria and hematuria. Musculoskeletal: Positive for neck pain. Negative for back pain. Neurological: Negative for dizziness and numbness. Current Outpatient Medications on File Prior to Visit   Medication Sig Dispense Refill    atorvastatin (LIPITOR) 40 MG tablet Take 1 tablet by mouth daily 30 tablet 3    cetaphil (CETAPHIL) lotion Apply topically as needed.  12 oz 1    guaiFENesin (MUCINEX) 600 MG extended release tablet Take 1 tablet by mouth 2 times daily for
completing antibiotic if symptoms persist. Agree with assessment and plan with resident. Please see their note for further details.     Hannah Robin DO

## 2019-08-27 ENCOUNTER — OFFICE VISIT (OUTPATIENT)
Dept: INTERNAL MEDICINE | Age: 39
End: 2019-08-27
Payer: MEDICARE

## 2019-08-27 ENCOUNTER — CARE COORDINATION (OUTPATIENT)
Dept: INTERNAL MEDICINE | Age: 39
End: 2019-08-27

## 2019-08-27 VITALS
DIASTOLIC BLOOD PRESSURE: 86 MMHG | BODY MASS INDEX: 28.06 KG/M2 | SYSTOLIC BLOOD PRESSURE: 132 MMHG | WEIGHT: 200.4 LBS | TEMPERATURE: 97.1 F | HEART RATE: 81 BPM | RESPIRATION RATE: 16 BRPM | HEIGHT: 71 IN

## 2019-08-27 DIAGNOSIS — B36.9 FUNGAL DERMATITIS: Primary | ICD-10-CM

## 2019-08-27 DIAGNOSIS — E04.9 ENLARGED THYROID GLAND: ICD-10-CM

## 2019-08-27 DIAGNOSIS — R05.9 COUGH: ICD-10-CM

## 2019-08-27 DIAGNOSIS — E04.1 THYROID NODULE: ICD-10-CM

## 2019-08-27 DIAGNOSIS — R05.3 CHRONIC COUGH: ICD-10-CM

## 2019-08-27 DIAGNOSIS — B35.4 TINEA CORPORIS: ICD-10-CM

## 2019-08-27 DIAGNOSIS — R68.89 COPIOUS ORAL SECRETIONS: ICD-10-CM

## 2019-08-27 DIAGNOSIS — L30.9 DERMATITIS: ICD-10-CM

## 2019-08-27 PROCEDURE — 1036F TOBACCO NON-USER: CPT | Performed by: INTERNAL MEDICINE

## 2019-08-27 PROCEDURE — 99214 OFFICE O/P EST MOD 30 MIN: CPT | Performed by: INTERNAL MEDICINE

## 2019-08-27 PROCEDURE — 99213 OFFICE O/P EST LOW 20 MIN: CPT | Performed by: INTERNAL MEDICINE

## 2019-08-27 PROCEDURE — G8417 CALC BMI ABV UP PARAM F/U: HCPCS | Performed by: INTERNAL MEDICINE

## 2019-08-27 PROCEDURE — G8427 DOCREV CUR MEDS BY ELIG CLIN: HCPCS | Performed by: INTERNAL MEDICINE

## 2019-08-27 RX ORDER — CLOTRIMAZOLE 1 %
CREAM (GRAM) TOPICAL
Qty: 1 TUBE | Refills: 1 | Status: SHIPPED | OUTPATIENT
Start: 2019-08-27

## 2019-08-27 RX ORDER — GUAIFENESIN 600 MG/1
600 TABLET, EXTENDED RELEASE ORAL 2 TIMES DAILY
Qty: 30 TABLET | Refills: 0 | Status: SHIPPED | OUTPATIENT
Start: 2019-08-27 | End: 2019-09-11

## 2019-08-27 SDOH — ECONOMIC STABILITY: FOOD INSECURITY: WITHIN THE PAST 12 MONTHS, THE FOOD YOU BOUGHT JUST DIDN'T LAST AND YOU DIDN'T HAVE MONEY TO GET MORE.: NEVER TRUE

## 2019-08-27 SDOH — ECONOMIC STABILITY: INCOME INSECURITY: HOW HARD IS IT FOR YOU TO PAY FOR THE VERY BASICS LIKE FOOD, HOUSING, MEDICAL CARE, AND HEATING?: NOT VERY HARD

## 2019-08-27 SDOH — HEALTH STABILITY: MENTAL HEALTH: HOW OFTEN DO YOU HAVE A DRINK CONTAINING ALCOHOL?: MONTHLY OR LESS

## 2019-08-27 SDOH — HEALTH STABILITY: PHYSICAL HEALTH: ON AVERAGE, HOW MANY MINUTES DO YOU ENGAGE IN EXERCISE AT THIS LEVEL?: 120 MIN

## 2019-08-27 SDOH — SOCIAL STABILITY: SOCIAL INSECURITY: WITHIN THE LAST YEAR, HAVE YOU BEEN AFRAID OF YOUR PARTNER OR EX-PARTNER?: PATIENT DECLINED

## 2019-08-27 SDOH — ECONOMIC STABILITY: TRANSPORTATION INSECURITY
IN THE PAST 12 MONTHS, HAS LACK OF TRANSPORTATION KEPT YOU FROM MEETINGS, WORK, OR FROM GETTING THINGS NEEDED FOR DAILY LIVING?: NO

## 2019-08-27 SDOH — SOCIAL STABILITY: SOCIAL INSECURITY
WITHIN THE LAST YEAR, HAVE TO BEEN RAPED OR FORCED TO HAVE ANY KIND OF SEXUAL ACTIVITY BY YOUR PARTNER OR EX-PARTNER?: PATIENT DECLINED

## 2019-08-27 SDOH — SOCIAL STABILITY: SOCIAL NETWORK: HOW OFTEN DO YOU GET TOGETHER WITH FRIENDS OR RELATIVES?: MORE THAN THREE TIMES A WEEK

## 2019-08-27 SDOH — SOCIAL STABILITY: SOCIAL INSECURITY
WITHIN THE LAST YEAR, HAVE YOU BEEN KICKED, HIT, SLAPPED, OR OTHERWISE PHYSICALLY HURT BY YOUR PARTNER OR EX-PARTNER?: PATIENT DECLINED

## 2019-08-27 SDOH — SOCIAL STABILITY: SOCIAL NETWORK: HOW OFTEN DO YOU ATTENT MEETINGS OF THE CLUB OR ORGANIZATION YOU BELONG TO?: NEVER

## 2019-08-27 SDOH — ECONOMIC STABILITY: FOOD INSECURITY: WITHIN THE PAST 12 MONTHS, YOU WORRIED THAT YOUR FOOD WOULD RUN OUT BEFORE YOU GOT MONEY TO BUY MORE.: NEVER TRUE

## 2019-08-27 SDOH — SOCIAL STABILITY: SOCIAL NETWORK: HOW OFTEN DO YOU ATTEND CHURCH OR RELIGIOUS SERVICES?: MORE THAN 4 TIMES PER YEAR

## 2019-08-27 SDOH — SOCIAL STABILITY: SOCIAL NETWORK
DO YOU BELONG TO ANY CLUBS OR ORGANIZATIONS SUCH AS CHURCH GROUPS UNIONS, FRATERNAL OR ATHLETIC GROUPS, OR SCHOOL GROUPS?: NO

## 2019-08-27 SDOH — ECONOMIC STABILITY: TRANSPORTATION INSECURITY
IN THE PAST 12 MONTHS, HAS THE LACK OF TRANSPORTATION KEPT YOU FROM MEDICAL APPOINTMENTS OR FROM GETTING MEDICATIONS?: NO

## 2019-08-27 SDOH — HEALTH STABILITY: PHYSICAL HEALTH: ON AVERAGE, HOW MANY DAYS PER WEEK DO YOU ENGAGE IN MODERATE TO STRENUOUS EXERCISE (LIKE A BRISK WALK)?: 7 DAYS

## 2019-08-27 SDOH — SOCIAL STABILITY: SOCIAL INSECURITY
WITHIN THE LAST YEAR, HAVE YOU BEEN HUMILIATED OR EMOTIONALLY ABUSED IN OTHER WAYS BY YOUR PARTNER OR EX-PARTNER?: PATIENT DECLINED

## 2019-08-27 SDOH — SOCIAL STABILITY: SOCIAL NETWORK: ARE YOU MARRIED, WIDOWED, DIVORCED, SEPARATED, NEVER MARRIED, OR LIVING WITH A PARTNER?: PATIENT DECLINED

## 2019-08-27 SDOH — SOCIAL STABILITY: SOCIAL NETWORK
IN A TYPICAL WEEK, HOW MANY TIMES DO YOU TALK ON THE PHONE WITH FAMILY, FRIENDS, OR NEIGHBORS?: MORE THAN THREE TIMES A WEEK

## 2019-08-27 SDOH — HEALTH STABILITY: MENTAL HEALTH
STRESS IS WHEN SOMEONE FEELS TENSE, NERVOUS, ANXIOUS, OR CAN'T SLEEP AT NIGHT BECAUSE THEIR MIND IS TROUBLED. HOW STRESSED ARE YOU?: ONLY A LITTLE

## 2019-08-27 ASSESSMENT — PATIENT HEALTH QUESTIONNAIRE - PHQ9
SUM OF ALL RESPONSES TO PHQ QUESTIONS 1-9: 0
SUM OF ALL RESPONSES TO PHQ QUESTIONS 1-9: 0

## 2019-08-27 ASSESSMENT — ENCOUNTER SYMPTOMS
BLOOD IN STOOL: 0
VOMITING: 0
NAUSEA: 0
COUGH: 0
CHEST TIGHTNESS: 0
EYE ITCHING: 0
RHINORRHEA: 0
SINUS PAIN: 0
SINUS PRESSURE: 0
SHORTNESS OF BREATH: 0
TROUBLE SWALLOWING: 0
ABDOMINAL PAIN: 0
DIARRHEA: 0
CONSTIPATION: 1
EYE DISCHARGE: 0
WHEEZING: 0
SORE THROAT: 0

## 2019-08-27 NOTE — CARE COORDINATION
8/14/19   Anthony Chavis MD   mupirocin (BACTROBAN) 2 % cream Apply topically 3 times daily. 8/9/19 9/8/19  Yunier Gao MD   polyethylene glycol [facility]) powder Take 17 g by mouth daily 8/8/19 9/7/19  Anny Friend MD   polyethylene glycol [facility]) powder Take 17 g by mouth daily 8/8/19 9/7/19  Anny Friend MD   DULoxetine (CYMBALTA) 60 MG extended release capsule TK 2 CS PO QAM 12/26/18   Historical Provider, MD   fluticasone (FLONASE) 50 MCG/ACT nasal spray 1 spray by Nasal route daily 12/19/18   Thersa Rosanna, DO   albuterol sulfate HFA (VENTOLIN HFA) 108 (90 Base) MCG/ACT inhaler Inhale 2 puffs into the lungs every 6 hours as needed for Wheezing 12/19/18   Thersa Rosanna, DO   doxepin (SINEQUAN) 10 MG capsule TK 1 OR 2 CS PO QHS PRN 3/5/18   Historical Provider, MD   ibuprofen (ADVIL;MOTRIN) 800 MG tablet Take 1 tablet by mouth every 6 hours as needed for Pain 3/26/18 8/9/19  Aniyah Franklin, APRN - CNP       Future Appointments   Date Time Provider Demetrio Montiel   9/5/2019  1:00 PM Corrinne Griffith, MD [facility]     ,   Diabetes Assessment    Medic Alert ID:  No  Meal Planning:  None   How often do you test your blood sugar?:  Meals, Bedtime   Do you have barriers with adherence to non-pharmacologic self-management interventions?  (Nutrition/Exercise/Self-Monitoring):  No   Have you ever had to go to the ED for symptoms of low blood sugar?:  No       Do you have hyperglycemia symptoms?:  No   Do you have hypoglycemia symptoms?:  No   Blood Sugar Monitoring Regimen:  Not Testing       and   General Assessment    Do you have any symptoms that are causing concern?:  Yes  Progression since Onset:  Gradually Worsening  Reported Symptoms:  Pain, Rash (Comment: neck)

## 2019-08-27 NOTE — PROGRESS NOTES
Gastrointestinal: Positive for constipation. Negative for abdominal pain, blood in stool, diarrhea, nausea and vomiting. Endocrine: Negative. Genitourinary: Negative for difficulty urinating and flank pain. Skin: Negative for rash. Neurological: Negative for dizziness, weakness, light-headedness, numbness and headaches. Outpatient Medications Marked as Taking for the 8/27/19 encounter (Office Visit) with Lynn Godinez MD   Medication Sig Dispense Refill    clotrimazole (LOTRIMIN) 1 % cream Apply topically 2 times daily. Apply 1/2 inch beyond rash for 4 weeks. Continue 1 week resolution. 1 Tube 1    guaiFENesin (MUCINEX) 600 MG extended release tablet Take 1 tablet by mouth 2 times daily for 15 days 30 tablet 0    amoxicillin-clavulanate (AUGMENTIN) 875-125 MG per tablet Take 1 tablet by mouth 2 times daily for 10 days 20 tablet 0    triamcinolone (KENALOG) 0.025 % ointment Apply topically over neck area 2 times daily. 1 Tube 0    atorvastatin (LIPITOR) 40 MG tablet Take 1 tablet by mouth daily 30 tablet 3    cetaphil (CETAPHIL) lotion Apply topically as needed. 12 oz 1    Insulin Syringe-Needle U-100 30G X 1/2\" 1 ML MISC 1 each by Does not apply route daily Use as directed 100 each 11    insulin glargine (LANTUS) 100 UNIT/ML injection vial Inject 25 Units into the skin nightly 5 vial 5    insulin lispro (HUMALOG KWIKPEN) 100 UNIT/ML pen Inject 12 Units into the skin 3 times daily (before meals) Take 12 unit pre meal three time a day 2 pen 3    Insulin Pen Needle 30G X 8 MM MISC 1 each by Does not apply route daily 100 each 3    blood glucose monitor strips Check blood sugar once daily fasting. May substitute brand for insurance coverage 100 strip 2    glucose monitoring kit (FREESTYLE) monitoring kit Check blood sugar once daily fasting. May substitute brand for insurance coverage 1 kit 0    Lancets Misc. MISC Check blood glucose once daily fasting.  May substitute brand for insurance

## 2019-09-03 ENCOUNTER — HOSPITAL ENCOUNTER (OUTPATIENT)
Dept: ULTRASOUND IMAGING | Age: 39
Discharge: HOME | End: 2019-09-05
Payer: MEDICARE

## 2019-09-03 DIAGNOSIS — E04.9 ENLARGED THYROID GLAND: ICD-10-CM

## 2019-09-03 PROCEDURE — 76536 US EXAM OF HEAD AND NECK: CPT

## 2019-09-05 ENCOUNTER — OFFICE VISIT (OUTPATIENT)
Dept: INTERNAL MEDICINE | Age: 39
End: 2019-09-05
Payer: MEDICARE

## 2019-09-05 VITALS
RESPIRATION RATE: 18 BRPM | HEIGHT: 71 IN | DIASTOLIC BLOOD PRESSURE: 82 MMHG | OXYGEN SATURATION: 98 % | HEART RATE: 88 BPM | WEIGHT: 204.6 LBS | SYSTOLIC BLOOD PRESSURE: 120 MMHG | BODY MASS INDEX: 28.64 KG/M2 | TEMPERATURE: 98.2 F

## 2019-09-05 DIAGNOSIS — E11.49 OTHER DIABETIC NEUROLOGICAL COMPLICATION ASSOCIATED WITH TYPE 2 DIABETES MELLITUS (HCC): ICD-10-CM

## 2019-09-05 DIAGNOSIS — E04.1 THYROID NODULE: Primary | ICD-10-CM

## 2019-09-05 PROCEDURE — 99213 OFFICE O/P EST LOW 20 MIN: CPT | Performed by: INTERNAL MEDICINE

## 2019-09-05 PROCEDURE — 99214 OFFICE O/P EST MOD 30 MIN: CPT | Performed by: INTERNAL MEDICINE

## 2019-09-05 PROCEDURE — 1036F TOBACCO NON-USER: CPT | Performed by: INTERNAL MEDICINE

## 2019-09-05 PROCEDURE — G8427 DOCREV CUR MEDS BY ELIG CLIN: HCPCS | Performed by: INTERNAL MEDICINE

## 2019-09-05 PROCEDURE — G8417 CALC BMI ABV UP PARAM F/U: HCPCS | Performed by: INTERNAL MEDICINE

## 2019-09-05 PROCEDURE — 3046F HEMOGLOBIN A1C LEVEL >9.0%: CPT | Performed by: INTERNAL MEDICINE

## 2019-09-05 PROCEDURE — 2022F DILAT RTA XM EVC RTNOPTHY: CPT | Performed by: INTERNAL MEDICINE

## 2019-09-05 RX ORDER — CALCIUM CARB/VITAMIN D3/VIT K1 500-100-40
1 TABLET,CHEWABLE ORAL 2 TIMES DAILY
COMMUNITY

## 2019-09-05 NOTE — PROGRESS NOTES
Patient verbalized understanding of office instructions. He will call with questions or concerns. Pt was given discharge instructions, scripts for lab work to be done and BS log sheet to record BS and bring to next office visit. All questions were fully answered.   Instructed to stop at  for printed AVS

## 2019-09-06 ENCOUNTER — HOSPITAL ENCOUNTER (OUTPATIENT)
Age: 39
Discharge: HOME | End: 2019-09-06
Payer: MEDICARE

## 2019-09-06 DIAGNOSIS — E11.65 TYPE 2 DIABETES MELLITUS WITH HYPERGLYCEMIA, WITH LONG-TERM CURRENT USE OF INSULIN (HCC): ICD-10-CM

## 2019-09-06 DIAGNOSIS — Z79.4 TYPE 2 DIABETES MELLITUS WITH HYPERGLYCEMIA, WITH LONG-TERM CURRENT USE OF INSULIN (HCC): ICD-10-CM

## 2019-09-06 DIAGNOSIS — E04.1 THYROID NODULE: ICD-10-CM

## 2019-09-06 LAB
CHOLESTEROL, TOTAL: 210 MG/DL (ref 0–199)
CREATININE URINE: 67 MG/DL (ref 40–278)
HBA1C MFR BLD: 11 % (ref 4–5.6)
HDLC SERPL-MCNC: 58 MG/DL
LDL CHOLESTEROL CALCULATED: 135 MG/DL (ref 0–99)
MICROALBUMIN UR-MCNC: 87.4 MG/L
MICROALBUMIN/CREAT UR-RTO: 130.4 (ref 0–30)
T3 TOTAL: 93.08 NG/DL (ref 80–200)
T4 FREE: 1.56 NG/DL (ref 0.93–1.7)
TRIGL SERPL-MCNC: 86 MG/DL (ref 0–149)
TSH SERPL DL<=0.05 MIU/L-ACNC: 0.71 UIU/ML (ref 0.27–4.2)
VLDLC SERPL CALC-MCNC: 17 MG/DL

## 2019-09-06 PROCEDURE — 36415 COLL VENOUS BLD VENIPUNCTURE: CPT

## 2019-09-06 PROCEDURE — 82570 ASSAY OF URINE CREATININE: CPT

## 2019-09-06 PROCEDURE — 84443 ASSAY THYROID STIM HORMONE: CPT

## 2019-09-06 PROCEDURE — 84480 ASSAY TRIIODOTHYRONINE (T3): CPT

## 2019-09-06 PROCEDURE — 83036 HEMOGLOBIN GLYCOSYLATED A1C: CPT

## 2019-09-06 PROCEDURE — 82044 UR ALBUMIN SEMIQUANTITATIVE: CPT

## 2019-09-06 PROCEDURE — 84439 ASSAY OF FREE THYROXINE: CPT

## 2019-09-06 PROCEDURE — 80061 LIPID PANEL: CPT

## 2019-09-09 ENCOUNTER — CARE COORDINATION (OUTPATIENT)
Dept: CARE COORDINATION | Age: 39
End: 2019-09-09

## 2019-09-10 ENCOUNTER — TELEPHONE (OUTPATIENT)
Dept: INTERNAL MEDICINE | Age: 39
End: 2019-09-10

## 2019-09-11 ENCOUNTER — TELEPHONE (OUTPATIENT)
Dept: INTERNAL MEDICINE | Age: 39
End: 2019-09-11

## 2019-09-11 NOTE — TELEPHONE ENCOUNTER
Patient called in at 9:00. He is currently in [place]. He is not sure he is coming back to [place]. I transferred him to IR scheduling, he was to have thyroid bx. I spoke to [facility] from IR scheduling patient is not scheduling at this time as his future living arrangement is uncertain. [facility] also cancelled referral.  Patient will call us or IR scheduling if he returns.

## 2019-09-26 PROBLEM — R05.9 COUGH: Status: RESOLVED | Noted: 2019-08-27 | Resolved: 2019-09-26

## 2019-10-21 ENCOUNTER — CARE COORDINATION (OUTPATIENT)
Dept: CARE COORDINATION | Age: 39
End: 2019-10-21